# Patient Record
Sex: FEMALE | Race: BLACK OR AFRICAN AMERICAN | Employment: FULL TIME | URBAN - METROPOLITAN AREA
[De-identification: names, ages, dates, MRNs, and addresses within clinical notes are randomized per-mention and may not be internally consistent; named-entity substitution may affect disease eponyms.]

---

## 2017-03-14 ENCOUNTER — OFFICE VISIT (OUTPATIENT)
Dept: OBGYN CLINIC | Age: 32
End: 2017-03-14

## 2017-03-14 VITALS
HEIGHT: 64 IN | DIASTOLIC BLOOD PRESSURE: 87 MMHG | BODY MASS INDEX: 31.76 KG/M2 | SYSTOLIC BLOOD PRESSURE: 134 MMHG | HEART RATE: 89 BPM | WEIGHT: 186 LBS

## 2017-03-14 DIAGNOSIS — N92.6 MISSED MENSES: Primary | ICD-10-CM

## 2017-03-14 NOTE — PROGRESS NOTES
26 y/o  presents as a missed menses. She states she had un-protected sex once and became pregnant un-intentionally. She has noted nausea, but denies vomiting. She denies any bleeding or pain. ROS: per above. Visit Vitals    /87    Pulse 89    Ht 5' 4\" (1.626 m)    Wt 186 lb (84.4 kg)    LMP 2017 (Exact Date)    BMI 31.93 kg/m2     Exam: deferred  TV-U/s: single IUP @ 7w1d. Normal ovaries    A/P:  Early IUP with confirmed FCA. Pt. Declined anti-emetics  RTO in 2 weeks for new OB visit.

## 2017-03-28 ENCOUNTER — ROUTINE PRENATAL (OUTPATIENT)
Dept: OBGYN CLINIC | Age: 32
End: 2017-03-28

## 2017-03-28 VITALS
BODY MASS INDEX: 32.1 KG/M2 | WEIGHT: 188 LBS | HEART RATE: 81 BPM | DIASTOLIC BLOOD PRESSURE: 64 MMHG | SYSTOLIC BLOOD PRESSURE: 121 MMHG | HEIGHT: 64 IN

## 2017-03-28 DIAGNOSIS — Z34.81 ENCOUNTER FOR SUPERVISION OF OTHER NORMAL PREGNANCY IN FIRST TRIMESTER: ICD-10-CM

## 2017-03-28 DIAGNOSIS — Z01.419 WELL WOMAN EXAM WITH ROUTINE GYNECOLOGICAL EXAM: ICD-10-CM

## 2017-03-28 DIAGNOSIS — O21.9 NAUSEA AND VOMITING IN PREGNANCY PRIOR TO 22 WEEKS GESTATION: ICD-10-CM

## 2017-03-28 DIAGNOSIS — Z34.90 PREGNANCY, UNSPECIFIED GESTATIONAL AGE: Primary | ICD-10-CM

## 2017-03-28 LAB
ANTIBODY SCREEN, EXTERNAL: NORMAL
HBSAG, EXTERNAL: NEGATIVE
RUBELLA, EXTERNAL: NORMAL
T. PALLIDUM, EXTERNAL: NON REACTIVE

## 2017-03-28 RX ORDER — ONDANSETRON 4 MG/1
4 TABLET, ORALLY DISINTEGRATING ORAL
Qty: 30 TAB | Refills: 1 | Status: SHIPPED | OUTPATIENT
Start: 2017-03-28 | End: 2017-10-21

## 2017-03-28 NOTE — MR AVS SNAPSHOT
Visit Information Date & Time Provider Department Dept. Phone Encounter #  
 3/28/2017 10:00 AM Hao ReederRocco OB/ [id number] Follow-up Instructions Return in about 4 weeks (around 4/25/2017). Your Appointments 4/25/2017  9:45 AM  
OB VISIT with Hao Reeder MD  
[address] [place] [address]) Appt Note: ob  
 [phone]  
[phone]  
  
   
 [phone] Upcoming Health Maintenance Date Due INFLUENZA AGE 9 TO ADULT 8/1/2016 PAP AKA CERVICAL CYTOLOGY 10/22/2018 Allergies as of 3/28/2017  Review Complete On: 3/28/2017 By: Hao Reeder MD  
 No Known Allergies Current Immunizations  Reviewed on 5/24/2016 Name Date Tdap 3/23/2016 Not reviewed this visit You Were Diagnosed With   
  
 Codes Comments Pregnancy, unspecified gestational age    -  Primary ICD-10-CM: Z33.3 ICD-9-CM: V22.2 Well woman exam with routine gynecological exam     ICD-10-CM: Q47.546 ICD-9-CM: V72.31 Nausea and vomiting in pregnancy prior to 22 weeks gestation     ICD-10-CM: O21.9 ICD-9-CM: 643.00 Encounter for supervision of other normal pregnancy in first trimester     ICD-10-CM: Z34.81 Vitals BP Pulse Height(growth percentile) Weight(growth percentile) LMP BMI  
 121/64 81 5' 4\" (1.626 m) 188 lb (85.3 kg) 01/14/2017 (Exact Date) 32.27 kg/m2 OB Status Smoking Status Pregnant Never Smoker BMI and BSA Data Body Mass Index Body Surface Area  
 32.27 kg/m 2 1.96 m 2 Preferred Pharmacy Pharmacy Name Phone [facility] [address] [facility] [address] [phone] Your Updated Medication List  
  
   
This list is accurate as of: 3/28/17  4:37 PM.  Always use your most recent med list.  
  
  
  
  
 ondansetron 4 mg disintegrating tablet Commonly known as:  ZOFRAN ODT Take 1 Tab by mouth every eight (8) hours as needed for Nausea. Indications: EXCESSIVE VOMITING IN PREGNANCY PRENATAL MULTI PO Take  by mouth. Prescriptions Sent to Pharmacy Refills  
 ondansetron (ZOFRAN ODT) 4 mg disintegrating tablet 1 Sig: Take 1 Tab by mouth every eight (8) hours as needed for Nausea. Indications: EXCESSIVE VOMITING IN PREGNANCY Class: Normal  
 Pharmacy: [address] [place] [address] #: [phone] Route: Oral  
  
We Performed the Following CBC WITH AUTOMATED DIFF [61396 CPT(R)] CULTURE, URINE [id number] CPT(R)] HEMOGLOBIN FRACTIONATION [[id number] Custom] HEP B SURFACE AG [id number] CPT(R)] HIV 1/2 AG/AB, 4TH GENERATION,W RFLX CONFIRM [[id number] Custom] RPR [26506 CPT(R)] RUBELLA AB, IGG [id number] CPT(R)] TYPE & SCREEN [[id number] Custom] Comments:  
 ENTER SURGERY DATE IF FOR PRE-OP TESTING. Follow-up Instructions Return in about 4 weeks (around 4/25/2017). Patient Instructions Managing Morning Sickness: Care Instructions Your Care Instructions For many women, the toughest part of early pregnancy is morning sickness. Morning sickness can range from mild nausea to severe nausea with bouts of vomiting. Symptoms may be worse in the morning, although they can strike at any time of the day or night. If you have nausea, vomiting, or both, look for safe measures that can bring you relief. You can take simple steps at home to manage morning sickness. These steps include changing what and when you eat and avoiding certain foods and smells. Some women find that acupuncture and acupressure wristbands also help. Follow-up care is a key part of your treatment and safety. Be sure to make and go to all appointments, and call your doctor if you are having problems.  It's also a good idea to know your test results and keep a list of the medicines you take. How can you care for yourself at home? · Keep food in your stomach, but not too much at once. Your nausea may be worse if your stomach is empty. Eat five or six small meals a day instead of three large meals. · For morning nausea, eat a small snack, such as a couple of crackers or dry biscuits, before rising. Allow a few minutes for your stomach to settle before you get out of bed slowly. · Drink plenty of fluids, enough so that your urine is light yellow or clear like water. If you have kidney, heart, or liver disease and have to limit fluids, talk with your doctor before you increase the amount of fluids you drink. Some women find that peppermint tea helps with nausea. · Eat more protein, such as chicken, fish, lean meat, beans, nuts, and seeds. · Eat carbohydrate foods, such as potatoes, whole-grain cereals, rice, and pasta. · Avoid smells and foods that make you feel nauseated. Spicy or high-fat foods, citrus juice, milk, coffee, and tea with caffeine often make nausea worse. · Do not drink alcohol. · Do not smoke. Try not to be around others who smoke. If you need help quitting, talk to your doctor about stop-smoking programs and medicines. These can increase your chances of quitting for good. · If you are taking iron supplements, ask your doctor if they are necessary. Iron can make nausea worse. · Get lots of rest. Stress and fatigue can make your morning sickness worse. · Ask your doctor about taking prescription medicine, or over-the-counter products such as vitamin B6, doxylamine, or sofia, to relieve your symptoms. Your doctor can tell you the doses that are safe for you. · Take your prenatal vitamins at night on a full stomach. When should you call for help? Call your doctor now or seek immediate medical care if: 
· You are too sick to your stomach to drink any fluids. · You have symptoms of dehydration, such as: ¨ Dry eyes and a dry mouth. ¨ Passing only a little dark urine. ¨ Feeling thirstier than usual. 
· You have new symptoms such as diarrhea, fever, or belly pain. Watch closely for changes in your health, and be sure to contact your doctor if: 
· You lose weight. · You have ongoing nausea and vomiting. Where can you learn more? Go to http://federica-theo.info/. Enter G946 in the search box to learn more about \"Managing Morning Sickness: Care Instructions. \" Current as of: June 8, 2016 Content Version: 11.2 © 9274-2821 SocialSmack. Care instructions adapted under license by [employer] (which disclaims liability or warranty for this information). If you have questions about a medical condition or this instruction, always ask your healthcare professional. [address] any warranty or liability for your use of this information. Introducing [facility] & HEALTH SERVICES! Romayne Duster introduces [employer] patient portal. Now you can access parts of your medical record, email your doctor's office, and request medication refills online. 1. In your internet browser, go to [email or web address] 2. Click on the First Time User? Click Here link in the Sign In box. You will see the New Member Sign Up page. 3. Enter your [employer] Access Code exactly as it appears below. You will not need to use this code after youve completed the sign-up process. If you do not sign up before the expiration date, you must request a new code. · [employer] Access Code: [email or web address] Expires: 6/12/2017 10:59 AM 
 
4. Enter the last four digits of your Social Security Number (xxxx) and Date of Birth (mm/dd/yyyy) as indicated and click Submit. You will be taken to the next sign-up page. 5. Create a [employer] Systems ID. This will be your [employer] login ID and cannot be changed, so think of one that is secure and easy to remember. 6. Create a Cyprotex password. You can change your password at any time. 7. Enter your Password Reset Question and Answer. This can be used at a later time if you forget your password. 8. Enter your e-mail address. You will receive e-mail notification when new information is available in [address] 9. Click Sign Up. You can now view and download portions of your medical record. 10. Click the Download Summary menu link to download a portable copy of your medical information. If you have questions, please visit the Frequently Asked Questions section of the Cyprotex website. Remember, [employer] is NOT to be used for urgent needs. For medical emergencies, dial 911. Now available from your iPhone and Android! Please provide this summary of care documentation to your next provider. Your primary care clinician is listed as April Thapa. If you have any questions after today's visit, please call [phone].

## 2017-03-28 NOTE — PROGRESS NOTES
PRENATAL INTAKE SUMMARY    Ms. Coleman presents today for her first prenatal visit. She had a confirmatory ultrasound 2 weeks ago. She notes continued nausea, but denies any vomiting. She feels at night it is worse. She denies any bleeding or pain. OB History      Para Term  AB TAB SAB Ectopic Multiple Living    3 2 2      0 1        I have reviewed the patient's medical, obstetrical, social, and family histories, medications, and available lab results. Subjective:   She has no unusual complaints and complains of nausea    Objective:   Initial Physical Exam (New OB)    GENERAL APPEARANCE: alert, well appearing, in no apparent distress  HEAD: normocephalic, atraumatic  MOUTH: mucous membranes moist, pharynx normal without lesions  THYROID: no thyromegaly or masses present  BREASTS: not examined  LUNGS: clear to auscultation, no wheezes, rales or rhonchi, symmetric air entry  HEART: regular rate and rhythm, no murmurs  ABDOMEN: soft, nontender, nondistended, no abnormal masses, no epigastric pain and FHT present  EXTREMITIES: no redness or tenderness in the calves or thighs, no edema  SKIN: normal coloration and turgor, no rashes  LYMPH NODES: no adenopathy palpable  NEUROLOGIC: alert, oriented, normal speech, no focal findings or movement disorder noted    PELVIC EXAM: EXTERNAL GENITALIA: normal appearing vulva with no masses, tenderness or lesions  VAGINA: no abnormal discharge or lesions  CERVIX: no lesions or cervical motion tenderness  UTERUS: gravid and consistent with 10 weeks  ADNEXA: no masses palpable and nontender  OB EXAM PELVIMETRY: appears adequate    Assessment/Plan:   Normal pregnancy with nausea. Orientation to the practice complete. Packet given. Declined genetic screening. RTO 4 weeks. Routine Prenatal care    ICD-10-CM ICD-9-CM    1.  Pregnancy, unspecified gestational age Z33.3 V22.2 CULTURE, URINE      HEMOGLOBIN FRACTIONATION      RPR      RUBELLA AB, IGG      CBC WITH AUTOMATED DIFF      HEP B SURFACE AG      HIV 1/2 AG/AB, 4TH GENERATION,W RFLX CONFIRM      TYPE & SCREEN   2. Well woman exam with routine gynecological exam Z01.419 V72.31 PAP IG, CT-NG-TV, RFX APTIMA HPV ASCUS ([id number])   3.  Nausea and vomiting in pregnancy prior to 22 weeks gestation O21.9 643.00 ondansetron (ZOFRAN ODT) 4 mg disintegrating tablet

## 2017-03-28 NOTE — PATIENT INSTRUCTIONS
Managing Morning Sickness: Care Instructions  Your Care Instructions  For many women, the toughest part of early pregnancy is morning sickness. Morning sickness can range from mild nausea to severe nausea with bouts of vomiting. Symptoms may be worse in the morning, although they can strike at any time of the day or night. If you have nausea, vomiting, or both, look for safe measures that can bring you relief. You can take simple steps at home to manage morning sickness. These steps include changing what and when you eat and avoiding certain foods and smells. Some women find that acupuncture and acupressure wristbands also help. Follow-up care is a key part of your treatment and safety. Be sure to make and go to all appointments, and call your doctor if you are having problems. It's also a good idea to know your test results and keep a list of the medicines you take. How can you care for yourself at home? · Keep food in your stomach, but not too much at once. Your nausea may be worse if your stomach is empty. Eat five or six small meals a day instead of three large meals. · For morning nausea, eat a small snack, such as a couple of crackers or dry biscuits, before rising. Allow a few minutes for your stomach to settle before you get out of bed slowly. · Drink plenty of fluids, enough so that your urine is light yellow or clear like water. If you have kidney, heart, or liver disease and have to limit fluids, talk with your doctor before you increase the amount of fluids you drink. Some women find that peppermint tea helps with nausea. · Eat more protein, such as chicken, fish, lean meat, beans, nuts, and seeds. · Eat carbohydrate foods, such as potatoes, whole-grain cereals, rice, and pasta. · Avoid smells and foods that make you feel nauseated. Spicy or high-fat foods, citrus juice, milk, coffee, and tea with caffeine often make nausea worse. · Do not drink alcohol. · Do not smoke.  Try not to be around others who smoke. If you need help quitting, talk to your doctor about stop-smoking programs and medicines. These can increase your chances of quitting for good. · If you are taking iron supplements, ask your doctor if they are necessary. Iron can make nausea worse. · Get lots of rest. Stress and fatigue can make your morning sickness worse. · Ask your doctor about taking prescription medicine, or over-the-counter products such as vitamin B6, doxylamine, or sofia, to relieve your symptoms. Your doctor can tell you the doses that are safe for you. · Take your prenatal vitamins at night on a full stomach. When should you call for help? Call your doctor now or seek immediate medical care if:  · You are too sick to your stomach to drink any fluids. · You have symptoms of dehydration, such as:  ¨ Dry eyes and a dry mouth. ¨ Passing only a little dark urine. ¨ Feeling thirstier than usual.  · You have new symptoms such as diarrhea, fever, or belly pain. Watch closely for changes in your health, and be sure to contact your doctor if:  · You lose weight. · You have ongoing nausea and vomiting. Where can you learn more? Go to http://federica-theo.info/. Enter E149 in the search box to learn more about \"Managing Morning Sickness: Care Instructions. \"  Current as of: June 8, 2016  Content Version: 11.2  © 8381-9677 Healthwise, Incorporated. Care instructions adapted under license by YelloYello (which disclaims liability or warranty for this information). If you have questions about a medical condition or this instruction, always ask your healthcare professional. [place] [address] any warranty or liability for your use of this information.

## 2017-03-29 LAB
ABSOLUTE LYMPHOCYTE COUNT, 10803: 1.9 K/UL (ref 1–4.8)
BASOPHILS # BLD: 0 K/UL (ref 0–0.2)
BASOPHILS NFR BLD: 1 % (ref 0–2)
EOSINOPHIL # BLD: 0.1 K/UL (ref 0–0.5)
EOSINOPHIL NFR BLD: 1 % (ref 0–6)
ERYTHROCYTE [DISTWIDTH] IN BLOOD BY AUTOMATED COUNT: 12.7 % (ref 10–16)
GRANULOCYTES,GRANS: 57 % (ref 40–75)
HCT VFR BLD AUTO: 39.4 % (ref 35.1–46.5)
HEP B SURFACE AG SCRN, 006510: NORMAL
HGB BLD-MCNC: 13.3 G/DL (ref 11.7–15.5)
LYMPHOCYTES, LYMLT: 34 % (ref 27–45)
MCH RBC QN AUTO: 31 PG (ref 26–34)
MCHC RBC AUTO-ENTMCNC: 34 G/DL (ref 32–36)
MCV RBC AUTO: 93 FL (ref 80–95)
MONOCYTES # BLD: 0.5 K/UL (ref 0.1–0.9)
MONOCYTES NFR BLD: 8 % (ref 3–9)
NEUTROPHILS # BLD AUTO: 3.3 K/UL (ref 1.8–7.7)
PLATELET # BLD AUTO: 278 K/UL (ref 140–440)
PMV BLD AUTO: 12 FL (ref 6–10.8)
RBC # BLD AUTO: 4.25 M/UL (ref 3.8–5.2)
RUBV IGG SERPL IA-ACNC: 1.6 AI
SYPHILIS (T. PALLIDUM) IGG, 15809: NON REACTIVE
WBC # BLD AUTO: 5.8 K/UL (ref 4–11)

## 2017-03-30 LAB
CHLAMYDIA TRACHOMATIS THINPREP, 13342: NEGATIVE
CULTURE RESULT, SENTARA: NORMAL
HIV -1/0/2 AG/AB WITH REFLEX, 13463: NON REACTIVE
HIV 1 & 2 AB SER-IMP: NORMAL
NEISSERIA GONORRHOEAE THINPREP, 13343: NEGATIVE
PAP IMAGE GUIDED, 8900296: NORMAL
TRICHOMONAS NUC AMP-THIN PREP,13357: NEGATIVE

## 2017-04-04 LAB
ABO + RH BLD: NORMAL
ANTIBODY SCREEN INTERPRETATION, 14017: NEGATIVE
HEMOGLOBIN A1,HGBE1: 96.3 % (ref 96–98)
HEMOGLOBIN A2,HGBE2: 3.7 % (ref 2–4)
HEMOGLOBIN C,HGBE5: 0 % (ref 0–0)
HEMOGLOBIN D, 7336: 0 % (ref 0–0)
HEMOGLOBIN ELECTROPHORESIS INTERPRETATION, 406: NORMAL
HEMOGLOBIN F,HGBE3: 0 % (ref 0–0.1)
HEMOGLOBIN S SCREEN, 7338: NORMAL
HEMOGLOBIN S,HGBE4: 0 % (ref 0–0)
HEMOGLOBIN UNKNOWN, 7337: 0 % (ref 0–0)

## 2017-04-25 ENCOUNTER — ROUTINE PRENATAL (OUTPATIENT)
Dept: OBGYN CLINIC | Age: 32
End: 2017-04-25

## 2017-04-25 VITALS
HEIGHT: 64 IN | BODY MASS INDEX: 32.78 KG/M2 | SYSTOLIC BLOOD PRESSURE: 127 MMHG | DIASTOLIC BLOOD PRESSURE: 78 MMHG | WEIGHT: 192 LBS | HEART RATE: 92 BPM

## 2017-04-25 DIAGNOSIS — Z34.90 PREGNANCY, UNSPECIFIED GESTATIONAL AGE: Primary | ICD-10-CM

## 2017-04-25 NOTE — MR AVS SNAPSHOT
Visit Information Date & Time Provider Department Dept. Phone Encounter #  
 4/25/2017  9:45 AM Aramis Youngblood MD [facility] [phone] [id number] Follow-up Instructions Return in about 4 weeks (around 5/23/2017). Upcoming Health Maintenance Date Due INFLUENZA AGE 9 TO ADULT 8/1/2016 PAP AKA CERVICAL CYTOLOGY 3/28/2020 Allergies as of 4/25/2017  Review Complete On: 3/28/2017 By: Aramis Youngblood MD  
 No Known Allergies Current Immunizations  Reviewed on 5/24/2016 Name Date Tdap 3/23/2016 Not reviewed this visit Vitals BP Pulse Height(growth percentile) Weight(growth percentile) LMP BMI  
 127/78 92 5' 4\" (1.626 m) 192 lb (87.1 kg) 01/14/2017 (Exact Date) 32.96 kg/m2 OB Status Smoking Status Pregnant Never Smoker BMI and BSA Data Body Mass Index Body Surface Area  
 32.96 kg/m 2 1.98 m 2 Preferred Pharmacy Pharmacy Name Phone [facility] [address] [place] [address] [phone] Your Updated Medication List  
  
   
This list is accurate as of: 4/25/17 10:31 AM.  Always use your most recent med list.  
  
  
  
  
 ondansetron 4 mg disintegrating tablet Commonly known as:  ZOFRAN ODT Take 1 Tab by mouth every eight (8) hours as needed for Nausea. Indications: EXCESSIVE VOMITING IN PREGNANCY PRENATAL MULTI PO Take  by mouth. Follow-up Instructions Return in about 4 weeks (around 5/23/2017). Patient Instructions Pregnancy and Heartburn: Care Instructions Your Care Instructions Heartburn is a common problem during pregnancy. It's hard to ignore the burning pain or discomfort in your throat or chest. 
Heartburn happens when stomach acid backs up into the tube that carries food to the stomach. This tube is called the esophagus.  Early in pregnancy, heartburn is caused by hormone changes that slow down digestion. Later on, it's also caused by the large uterus pushing up on the stomach. Even though you can't fix the cause, there are things you can do to get relief. And heartburn usually improves or goes away after childbirth. Follow-up care is a key part of your treatment and safety. Be sure to make and go to all appointments, and call your doctor if you are having problems. It's also a good idea to know your test results and keep a list of the medicines you take. How can you care for yourself at home? · Eat small, frequent meals. · Do not eat chocolate, peppermint, or very spicy foods. Avoid drinks with caffeine, such as coffee, tea, and sodas. · Avoid bending over or lying down after meals. · Take a short walk after you eat. · If heartburn is a problem at night, do not eat for 2 hours before bedtime. · Take antacids like Mylanta, Maalox, Rolaids, or Tums. Do not take antacids that have sodium bicarbonate. Be careful when you take over-the-counter antacid medicines. Many of these medicines have aspirin in them. While you are pregnant, do not take aspirin or medicines that contain aspirin unless your doctor says it is okay. · If you're not getting relief, talk to your doctor. You may be able to take a stronger acid-reducing medicine. When should you call for help? Call your doctor now or seek immediate medical care if: 
· You have new belly pain, or your pain gets worse. · Your stools are black. · You have blood in your stools. Watch closely for changes in your health, and be sure to contact your doctor if: 
· You are not getting better after 2 days (48 hours). · Food seems to catch in your throat or chest. 
Where can you learn more? Go to http://federica-theo.info/. Enter M610 in the search box to learn more about \"Pregnancy and Heartburn: Care Instructions. \" Current as of: November 16, 2016 Content Version: 11.2 © 2547-4464 HealthCarnival, Incorporated. Care instructions adapted under license by [employer] (which disclaims liability or warranty for this information). If you have questions about a medical condition or this instruction, always ask your healthcare professional. [address] any warranty or liability for your use of this information. Introducing \[facility]\"" & HEALTH SERVICES! Shi Lara introduces [employer] patient portal. Now you can access parts of your medical record, email your doctor's office, and request medication refills online. 1. In your internet browser, go to [email or web address] 2. Click on the First Time User? Click Here link in the Sign In box. You will see the New Member Sign Up page. 3. Enter your [employer] Access Code exactly as it appears below. You will not need to use this code after youve completed the sign-up process. If you do not sign up before the expiration date, you must request a new code. · [employer] Access Code: [email or web address] Expires: 6/12/2017 10:59 AM 
 
4. Enter the last four digits of your Social Security Number (xxxx) and Date of Birth (mm/dd/yyyy) as indicated and click Submit. You will be taken to the next sign-up page. 5. Create a [employer] Relations ID. This will be your [employer] login ID and cannot be changed, so think of one that is secure and easy to remember. 6. Create a [employer] password. You can change your password at any time. 7. Enter your Password Reset Question and Answer. This can be used at a later time if you forget your password. 8. Enter your e-mail address. You will receive e-mail notification when new information is available in [address] 9. Click Sign Up. You can now view and download portions of your medical record. 10. Click the Download Summary menu link to download a portable copy of your medical information.  
 
If you have questions, please visit the Frequently Asked Questions section of the [employer] Network. Remember, Re[employer] Clothing[employer] is NOT to be used for urgent needs. For medical emergencies, dial 911. Now available from your iPhone and Android! Please provide this summary of care documentation to your next provider. Your primary care clinician is listed as Jayshree Huang. If you have any questions after today's visit, please call [phone].

## 2017-04-25 NOTE — PROGRESS NOTES
13w1d.  Pt. Notes nausea and vomiting much improved. Continues to take Zofran. Declined genetic testing. Labs reviewed. RTO 4 weeks.

## 2017-04-25 NOTE — PATIENT INSTRUCTIONS
Pregnancy and Heartburn: Care Instructions  Your Care Instructions    Heartburn is a common problem during pregnancy. It's hard to ignore the burning pain or discomfort in your throat or chest.  Heartburn happens when stomach acid backs up into the tube that carries food to the stomach. This tube is called the esophagus. Early in pregnancy, heartburn is caused by hormone changes that slow down digestion. Later on, it's also caused by the large uterus pushing up on the stomach. Even though you can't fix the cause, there are things you can do to get relief. And heartburn usually improves or goes away after childbirth. Follow-up care is a key part of your treatment and safety. Be sure to make and go to all appointments, and call your doctor if you are having problems. It's also a good idea to know your test results and keep a list of the medicines you take. How can you care for yourself at home? · Eat small, frequent meals. · Do not eat chocolate, peppermint, or very spicy foods. Avoid drinks with caffeine, such as coffee, tea, and sodas. · Avoid bending over or lying down after meals. · Take a short walk after you eat. · If heartburn is a problem at night, do not eat for 2 hours before bedtime. · Take antacids like Mylanta, Maalox, Rolaids, or Tums. Do not take antacids that have sodium bicarbonate. Be careful when you take over-the-counter antacid medicines. Many of these medicines have aspirin in them. While you are pregnant, do not take aspirin or medicines that contain aspirin unless your doctor says it is okay. · If you're not getting relief, talk to your doctor. You may be able to take a stronger acid-reducing medicine. When should you call for help? Call your doctor now or seek immediate medical care if:  · You have new belly pain, or your pain gets worse. · Your stools are black. · You have blood in your stools.   Watch closely for changes in your health, and be sure to contact your doctor if:  · You are not getting better after 2 days (48 hours). · Food seems to catch in your throat or chest.  Where can you learn more? Go to http://federica-theo.info/. Enter T683 in the search box to learn more about \"Pregnancy and Heartburn: Care Instructions. \"  Current as of: November 16, 2016  Content Version: 11.2  © 4107-8350 bCommunities. Care instructions adapted under license by [employer] (which disclaims liability or warranty for this information). If you have questions about a medical condition or this instruction, always ask your healthcare professional. [address] any warranty or liability for your use of this information.

## 2017-05-24 ENCOUNTER — ROUTINE PRENATAL (OUTPATIENT)
Dept: OBGYN CLINIC | Age: 32
End: 2017-05-24

## 2017-05-24 VITALS
HEIGHT: 64 IN | HEART RATE: 82 BPM | DIASTOLIC BLOOD PRESSURE: 76 MMHG | SYSTOLIC BLOOD PRESSURE: 144 MMHG | BODY MASS INDEX: 33.97 KG/M2 | WEIGHT: 199 LBS

## 2017-05-24 DIAGNOSIS — O09.892 SHORT INTERVAL BETWEEN PREGNANCIES AFFECTING PREGNANCY IN SECOND TRIMESTER, ANTEPARTUM: Primary | ICD-10-CM

## 2017-05-24 NOTE — MR AVS SNAPSHOT
Visit Information Date & Time Provider Department Dept. Phone Encounter #  
 5/24/2017 12:00 PM [phone] [id number] Your Appointments 6/14/2017  8:30 AM  
ULTRASOUND FOLLOW UP with Obgyn Ultrasound [facility] ([facility]) Appt Note: [facility] [phone]  
[phone]  
  
   
 [facility] [phone] Upcoming Health Maintenance Date Due INFLUENZA AGE 9 TO ADULT 8/1/2017 PAP AKA CERVICAL CYTOLOGY 3/28/2020 Allergies as of 5/24/2017  Review Complete On: 3/28/2017 By: Martin Green MD  
 No Known Allergies Current Immunizations  Reviewed on 5/24/2016 Name Date Tdap 3/23/2016 Not reviewed this visit You Were Diagnosed With   
  
 Codes Comments Short interval between pregnancies affecting pregnancy in second trimester, antepartum    -  Primary ICD-10-CM: I61.982 ICD-9-CM: V23.89 Vitals BP Pulse Height(growth percentile) Weight(growth percentile) LMP BMI  
 144/76 82 5' 4\" (1.626 m) 199 lb (90.3 kg) 01/14/2017 (Exact Date) 34.16 kg/m2 OB Status Smoking Status Pregnant Never Smoker BMI and BSA Data Body Mass Index Body Surface Area  
 34.16 kg/m 2 2.02 m 2 Preferred Pharmacy Pharmacy Name Phone [facility] [address], [place] [address] [phone] Your Updated Medication List  
  
   
This list is accurate as of: 5/24/17 12:27 PM.  Always use your most recent med list.  
  
  
  
  
 ondansetron 4 mg disintegrating tablet Commonly known as:  ZOFRAN ODT Take 1 Tab by mouth every eight (8) hours as needed for Nausea. Indications: EXCESSIVE VOMITING IN PREGNANCY PRENATAL MULTI PO Take  by mouth. To-Do List   
 06/14/2017 Imaging: AMB POC US OB >= 14 WKS, 1ST GESTATION Introducing [facility]! Isidro Masterson introduces [employer] patient portal. Now you can access parts of your medical record, email your doctor's office, and request medication refills online. 1. In your internet browser, go to [email or web address] 2. Click on the First Time User? Click Here link in the Sign In box. You will see the New Member Sign Up page. 3. Enter your [employer] Access Code exactly as it appears below. You will not need to use this code after youve completed the sign-up process. If you do not sign up before the expiration date, you must request a new code. · [employer] Access Code: [email or web address] Expires: 6/12/2017 10:59 AM 
 
4. Enter the last four digits of your Social Security Number (xxxx) and Date of Birth (mm/dd/yyyy) as indicated and click Submit. You will be taken to the next sign-up page. 5. Create a [employer] ID. This will be your [employer] login ID and cannot be changed, so think of one that is secure and easy to remember. 6. Create a [employer] password. You can change your password at any time. 7. Enter your Password Reset Question and Answer. This can be used at a later time if you forget your password. 8. Enter your e-mail address. You will receive e-mail notification when new information is available in [address] 9. Click Sign Up. You can now view and download portions of your medical record. 10. Click the Download Summary menu link to download a portable copy of your medical information. If you have questions, please visit the Frequently Asked Questions section of the [employer] website. Remember, [employer] is NOT to be used for urgent needs. For medical emergencies, dial 911. Now available from your iPhone and Android! Please provide this summary of care documentation to your next provider. Your primary care clinician is listed as Eli Romeo. If you have any questions after today's visit, please call [phone].

## 2017-05-24 NOTE — PROGRESS NOTES
17w2d.  NO OB issues. Denies LOf/Vb. Is feeling stressed and anxious. Has  from her  of 1 year. Is currently seeing a therapist and engaged in marital counseling. States she is unable to sleep, tired, and feels her mind racing. She notes she has never had anxiety before, but feels this may be anxiety. Denies depression- discussed common symptoms and risk for postpartum depression. Pt will continue to monitor. Discussed lifestyle modifications and stress relievers. Morph in 3 weeks with RTO in 4 weeks.

## 2017-06-14 ENCOUNTER — CLINICAL SUPPORT (OUTPATIENT)
Dept: OBGYN CLINIC | Age: 32
End: 2017-06-14

## 2017-06-14 DIAGNOSIS — O09.892 SHORT INTERVAL BETWEEN PREGNANCIES AFFECTING PREGNANCY IN SECOND TRIMESTER, ANTEPARTUM: ICD-10-CM

## 2017-06-20 ENCOUNTER — ROUTINE PRENATAL (OUTPATIENT)
Dept: OBGYN CLINIC | Age: 32
End: 2017-06-20

## 2017-06-20 VITALS
HEART RATE: 81 BPM | BODY MASS INDEX: 35 KG/M2 | DIASTOLIC BLOOD PRESSURE: 77 MMHG | HEIGHT: 64 IN | WEIGHT: 205 LBS | SYSTOLIC BLOOD PRESSURE: 133 MMHG

## 2017-06-20 DIAGNOSIS — F32.A DEPRESSION, UNSPECIFIED DEPRESSION TYPE: Primary | ICD-10-CM

## 2017-06-20 PROBLEM — O99.342 DEPRESSION AFFECTING PREGNANCY IN SECOND TRIMESTER, ANTEPARTUM: Status: ACTIVE | Noted: 2017-06-20

## 2017-06-20 RX ORDER — SERTRALINE HYDROCHLORIDE 50 MG/1
50 TABLET, FILM COATED ORAL DAILY
Qty: 30 TAB | Refills: 2 | Status: SHIPPED | OUTPATIENT
Start: 2017-06-20 | End: 2017-10-21

## 2017-06-20 NOTE — PATIENT INSTRUCTIONS

## 2017-06-20 NOTE — PROGRESS NOTES
21w1d.  No OB issues. Pt. Is currently in counseling for marital discord. Has a therapist- Dr. Wendi Malhotra. She has been counseled for a form of depression and recommended medication. Pt. Agrees that she isn't feeling well and is depressed and stressed. Has anhedonia for most things. Discussed risks of anti-depressants. Rx for Zoloft sent to pharmacy. RTO 4 weeks.

## 2017-07-06 ENCOUNTER — ROUTINE PRENATAL (OUTPATIENT)
Dept: OBGYN CLINIC | Age: 32
End: 2017-07-06

## 2017-07-06 VITALS
HEIGHT: 64 IN | SYSTOLIC BLOOD PRESSURE: 122 MMHG | BODY MASS INDEX: 35.51 KG/M2 | HEART RATE: 94 BPM | WEIGHT: 208 LBS | DIASTOLIC BLOOD PRESSURE: 73 MMHG

## 2017-07-06 DIAGNOSIS — O09.892 SHORT INTERVAL BETWEEN PREGNANCIES AFFECTING PREGNANCY IN SECOND TRIMESTER, ANTEPARTUM: Primary | ICD-10-CM

## 2017-07-06 NOTE — PROGRESS NOTES
23w2d.  No OB issues. Denies LOF/Vb  Pt. Notes depression has improved. She didn't start taking the Zoloft and feels better overall. Noted FM. Glucana at next visit. RTO 4 weeks.

## 2017-08-02 ENCOUNTER — ROUTINE PRENATAL (OUTPATIENT)
Dept: OBGYN CLINIC | Age: 32
End: 2017-08-02

## 2017-08-02 VITALS
HEIGHT: 64 IN | BODY MASS INDEX: 35.68 KG/M2 | HEART RATE: 105 BPM | WEIGHT: 209 LBS | SYSTOLIC BLOOD PRESSURE: 141 MMHG | DIASTOLIC BLOOD PRESSURE: 79 MMHG

## 2017-08-02 DIAGNOSIS — Z3A.27 27 WEEKS GESTATION OF PREGNANCY: Primary | ICD-10-CM

## 2017-08-02 NOTE — PATIENT INSTRUCTIONS
Weeks 26 to 30 of Your Pregnancy: Care Instructions  Your Care Instructions    You are now in your last trimester of pregnancy. Your baby is growing rapidly. And you'll probably feel your baby moving around more often. Your doctor may ask you to count your baby's kicks. Your back may ache as your body gets used to your baby's size and length. If you haven't already had the Tdap shot during this pregnancy, talk to your doctor about getting it. It will help protect your  against pertussis infection. During this time, it's important to take care of yourself and pay attention to what your body needs. If you feel sexual, explore ways to be close with your partner that match your comfort and desire. Use the tips provided in this care sheet to find ways to be sexual in your own way. Follow-up care is a key part of your treatment and safety. Be sure to make and go to all appointments, and call your doctor if you are having problems. It's also a good idea to know your test results and keep a list of the medicines you take. How can you care for yourself at home? Take it easy at work  · Take frequent breaks. If possible, stop working when you are tired, and rest during your lunch hour. · Take bathroom breaks every 2 hours. · Change positions often. If you sit for long periods, stand up and walk around. · When you stand for a long time, keep one foot on a low stool with your knee bent. After standing a lot, sit with your feet up. · Avoid fumes, chemicals, and tobacco smoke. Be sexual in your own way  · Having sex during pregnancy is okay, unless your doctor tells you not to. · You may be very interested in sex, or you may have no interest at all. · Your growing belly can make it hard to find a good position during intercourse. [place] and explore. · You may get cramps in your uterus when your partner touches your breasts.   · A back rub may relieve the backache or cramps that sometimes follow orgasm. Learn about  labor  · Watch for signs of  labor. You may be going into labor if:  ¨ You have menstrual-like cramps, with or without nausea. ¨ You have about 4 or more contractions in 20 minutes, or about 8 or more within 1 hour, even after you have had a glass of water and are resting. ¨ You have a low, dull backache that does not go away when you change your position. ¨ You have pain or pressure in your pelvis that comes and goes in a pattern. ¨ You have intestinal cramping or flu-like symptoms, with or without diarrhea. ¨ You notice an increase or change in your vaginal discharge. Discharge may be heavy, mucus-like, watery, or streaked with blood. ¨ Your water breaks. · If you think you have  labor:  ¨ Drink 2 or 3 glasses of water or juice. Not drinking enough fluids can cause contractions. ¨ Stop what you are doing, and empty your bladder. Then lie down on your left side for at least 1 hour. ¨ While lying on your side, find your breast bone. Put your fingers in the soft spot just below it. Move your fingers down toward your belly button to find the top of your uterus. Check to see if it is tight. ¨ Contractions can be weak or strong. Record your contractions for an hour. Time a contraction from the start of one contraction to the start of the next one. ¨ Single or several strong contractions without a pattern are called Jonah-Reinoso contractions. They are practice contractions but not the start of labor. They often stop if you change what you are doing. ¨ Call your doctor if you have regular contractions. Where can you learn more? Go to http://federica-theo.info/. Enter C440 in the search box to learn more about \"Weeks 26 to 30 of Your Pregnancy: Care Instructions. \"  Current as of: 2017  Content Version: 11.3  © 9998-2094 [employer].  Care instructions adapted under license by [employer] (which disclaims liability or warranty for this information). If you have questions about a medical condition or this instruction, always ask your healthcare professional. [place] [address] any warranty or liability for your use of this information.

## 2017-08-02 NOTE — MR AVS SNAPSHOT
Visit Information Date & Time Provider Department Dept. Phone Encounter #  
 8/2/2017  9:30 AM [facility] [place] [phone] [id number] Follow-up Instructions Return in about 3 weeks (around 8/23/2017). Your Appointments 8/23/2017  2:15 PM  
OB VISIT with Alyson Guevara MD  
[address] [place] [address] ([address] [place] [address]) Appt Note: ob  
 [address] [phone]  
[phone]  
  
   
 [address] [phone] Upcoming Health Maintenance Date Due  
 OB 3RD TRIMESTER TDAP 7/31/2017 INFLUENZA AGE 9 TO ADULT 8/1/2017 PAP AKA CERVICAL CYTOLOGY 3/28/2020 Allergies as of 8/2/2017  Review Complete On: 6/20/2017 By: Alyson Guevara MD  
 No Known Allergies Current Immunizations  Reviewed on 5/24/2016 Name Date Tdap 3/23/2016 Not reviewed this visit You Were Diagnosed With   
  
 Codes Comments 27 weeks gestation of pregnancy    -  Primary ICD-10-CM: Z3A.27 
ICD-9-CM: V22.2 Vitals BP Pulse Height(growth percentile) Weight(growth percentile) LMP BMI  
 141/79 (!) 105 5' 4\" (1.626 m) 209 lb (94.8 kg) 01/14/2017 (Exact Date) 35.87 kg/m2 OB Status Smoking Status Pregnant Never Smoker BMI and BSA Data Body Mass Index Body Surface Area  
 35.87 kg/m 2 2.07 m 2 Preferred Pharmacy Pharmacy Name Phone [facility] [address] [place], [place] [address] [address] [phone] Your Updated Medication List  
  
   
This list is accurate as of: 8/2/17 10:48 AM.  Always use your most recent med list.  
  
  
  
  
 ondansetron 4 mg disintegrating tablet Commonly known as:  ZOFRAN ODT Take 1 Tab by mouth every eight (8) hours as needed for Nausea. Indications: EXCESSIVE VOMITING IN PREGNANCY PRENATAL MULTI PO Take  by mouth. sertraline 50 mg tablet Commonly known as:  ZOLOFT  
 Take 1 Tab by mouth daily. Indications: ANXIETY WITH DEPRESSION Follow-up Instructions Return in about 3 weeks (around 2017). Patient Instructions Weeks 26 to 30 of Your Pregnancy: Care Instructions Your Care Instructions You are now in your last trimester of pregnancy. Your baby is growing rapidly. And you'll probably feel your baby moving around more often. Your doctor may ask you to count your baby's kicks. Your back may ache as your body gets used to your baby's size and length. If you haven't already had the Tdap shot during this pregnancy, talk to your doctor about getting it. It will help protect your  against pertussis infection. During this time, it's important to take care of yourself and pay attention to what your body needs. If you feel sexual, explore ways to be close with your partner that match your comfort and desire. Use the tips provided in this care sheet to find ways to be sexual in your own way. Follow-up care is a key part of your treatment and safety. Be sure to make and go to all appointments, and call your doctor if you are having problems. It's also a good idea to know your test results and keep a list of the medicines you take. How can you care for yourself at home? Take it easy at work · Take frequent breaks. If possible, stop working when you are tired, and rest during your lunch hour. · Take bathroom breaks every 2 hours. · Change positions often. If you sit for long periods, stand up and walk around. · When you stand for a long time, keep one foot on a low stool with your knee bent. After standing a lot, sit with your feet up. · Avoid fumes, chemicals, and tobacco smoke. Be sexual in your own way · Having sex during pregnancy is okay, unless your doctor tells you not to. · You may be very interested in sex, or you may have no interest at all.  
· Your growing belly can make it hard to find a good position during intercourse. [place] and explore. · You may get cramps in your uterus when your partner touches your breasts. · A back rub may relieve the backache or cramps that sometimes follow orgasm. Learn about  labor · Watch for signs of  labor. You may be going into labor if: 
¨ You have menstrual-like cramps, with or without nausea. ¨ You have about 4 or more contractions in 20 minutes, or about 8 or more within 1 hour, even after you have had a glass of water and are resting. ¨ You have a low, dull backache that does not go away when you change your position. ¨ You have pain or pressure in your pelvis that comes and goes in a pattern. ¨ You have intestinal cramping or flu-like symptoms, with or without diarrhea. ¨ You notice an increase or change in your vaginal discharge. Discharge may be heavy, mucus-like, watery, or streaked with blood. ¨ Your water breaks. · If you think you have  labor: ¨ Drink 2 or 3 glasses of water or juice. Not drinking enough fluids can cause contractions. ¨ Stop what you are doing, and empty your bladder. Then lie down on your left side for at least 1 hour. ¨ While lying on your side, find your breast bone. Put your fingers in the soft spot just below it. Move your fingers down toward your belly button to find the top of your uterus. Check to see if it is tight. ¨ Contractions can be weak or strong. Record your contractions for an hour. Time a contraction from the start of one contraction to the start of the next one. ¨ Single or several strong contractions without a pattern are called [place]-Reinoso contractions. They are practice contractions but not the start of labor. They often stop if you change what you are doing. ¨ Call your doctor if you have regular contractions. Where can you learn more? Go to http://federica-theo.info/. Enter R786 in the search box to learn more about \"Weeks 26 to 30 of Your Pregnancy: Care Instructions. \" 
 Current as of: March 16, 2017 Content Version: 11.3 © 5193-5594 [employer]. Care instructions adapted under license by [employer] (which disclaims liability or warranty for this information). If you have questions about a medical condition or this instruction, always ask your healthcare professional. [address] any warranty or liability for your use of this information. Introducing [facility]! Derrick Farrell introduces [employer] patient portal. Now you can access parts of your medical record, email your doctor's office, and request medication refills online. 1. In your internet browser, go to [email or web address] 2. Click on the First Time User? Click Here link in the Sign In box. You will see the New Member Sign Up page. 3. Enter your [employer] Access Code exactly as it appears below. You will not need to use this code after youve completed the sign-up process. If you do not sign up before the expiration date, you must request a new code. · [employer] Access Code: [email or web address] Expires: 10/4/2017  3:43 PM 
 
4. Enter the last four digits of your Social Security Number (xxxx) and Date of Birth (mm/dd/yyyy) as indicated and click Submit. You will be taken to the next sign-up page. 5. Create a [employer] ID. This will be your [employer] login ID and cannot be changed, so think of one that is secure and easy to remember. 6. Create a [employer] password. You can change your password at any time. 7. Enter your Password Reset Question and Answer. This can be used at a later time if you forget your password. 8. Enter your e-mail address. You will receive e-mail notification when new information is available in [address] 9. Click Sign Up. You can now view and download portions of your medical record. 10. Click the Download Summary menu link to download a portable copy of your medical information. If you have questions, please visit the Frequently Asked Questions section of the [employer] website. Remember, [employer] is NOT to be used for urgent needs. For medical emergencies, dial 911. Now available from your iPhone and Android! Please provide this summary of care documentation to your next provider. Your primary care clinician is listed as Michelle Serrato. If you have any questions after today's visit, please call [phone].

## 2017-08-09 ENCOUNTER — ROUTINE PRENATAL (OUTPATIENT)
Dept: OBGYN CLINIC | Age: 32
End: 2017-08-09

## 2017-08-09 VITALS
BODY MASS INDEX: 36.19 KG/M2 | DIASTOLIC BLOOD PRESSURE: 80 MMHG | RESPIRATION RATE: 18 BRPM | HEART RATE: 98 BPM | SYSTOLIC BLOOD PRESSURE: 140 MMHG | WEIGHT: 212 LBS | HEIGHT: 64 IN

## 2017-08-09 DIAGNOSIS — N89.8 VAGINAL DISCHARGE DURING PREGNANCY IN THIRD TRIMESTER: Primary | ICD-10-CM

## 2017-08-09 DIAGNOSIS — Z3A.27 27 WEEKS GESTATION OF PREGNANCY: ICD-10-CM

## 2017-08-09 DIAGNOSIS — O26.893 VAGINAL DISCHARGE DURING PREGNANCY IN THIRD TRIMESTER: Primary | ICD-10-CM

## 2017-08-09 RX ORDER — TERCONAZOLE 4 MG/G
1 CREAM VAGINAL
Qty: 45 G | Refills: 0 | Status: SHIPPED | OUTPATIENT
Start: 2017-08-09 | End: 2017-08-16

## 2017-08-10 LAB — WET MOUNT POCT, WMPOCT: NORMAL

## 2017-08-10 NOTE — PROGRESS NOTES
Patient presents for a problem visit complaining of a thick white vaginal discharge and vaginal itching. She has no OB complaints and notes good fetal movement. Wet prep: no clue cells, trace yeast, no trich, minimal WBCs  Rx Terazol. Follow up as scheduled for routine OB care. Plan of care discussed. Patient expressed understanding.

## 2017-08-25 ENCOUNTER — ROUTINE PRENATAL (OUTPATIENT)
Dept: OBGYN CLINIC | Age: 32
End: 2017-08-25

## 2017-08-25 VITALS
DIASTOLIC BLOOD PRESSURE: 75 MMHG | HEIGHT: 64 IN | BODY MASS INDEX: 36.19 KG/M2 | WEIGHT: 212 LBS | SYSTOLIC BLOOD PRESSURE: 128 MMHG | HEART RATE: 87 BPM

## 2017-08-25 DIAGNOSIS — Z3A.30 PREGNANCY WITH 30 COMPLETED WEEKS GESTATION: Primary | ICD-10-CM

## 2017-08-25 NOTE — PATIENT INSTRUCTIONS
Weeks 30 to 32 of Your Pregnancy: Care Instructions  Your Care Instructions    You have made it to the final months of your pregnancy. By now, your baby is really starting to look like a baby, with hair and plump skin. As you enter the final weeks of pregnancy, the reality of having a baby may start to set in. This is the time to settle on a name, get your household in order, set up a safe nursery, and find quality  if needed. Doing these things in advance will allow you to focus on caring for and enjoying your new baby. You may also want to have a tour of your hospital's labor and delivery unit to get a better idea of what to expect while you are in the hospital.  During these last months, it is very important to take good care of yourself and pay attention to what your body needs. If your doctor says it is okay for you to work, don't push yourself too hard. Use the tips provided in this care sheet to ease heartburn and care for varicose veins. If you haven't already had the Tdap shot during this pregnancy, talk to your doctor about getting it. It will help protect your  against pertussis infection. Follow-up care is a key part of your treatment and safety. Be sure to make and go to all appointments, and call your doctor if you are having problems. It's also a good idea to know your test results and keep a list of the medicines you take. How can you care for yourself at home? Pay attention to your baby's movements  · You should feel your baby move several times every day. · Your baby now turns less, and kicks and jabs more. · Your baby sleeps 20 to 45 minutes at a time and is more active at certain times of day. · If your doctor wants you to count your baby's kicks:  ¨ Empty your bladder, and lie on your side or relax in a comfortable chair. ¨ Write down your start time. ¨ Pay attention only to your baby's movements. Count any movement except hiccups.   ¨ After you have counted 10 movements, write down your stop time. ¨ Write down how many minutes it took for your baby to move 10 times. ¨ If an hour goes by and you have not recorded 10 movements, have something to eat or drink and then count for another hour. If you do not record 10 movements in either hour, call your doctor. Ease heartburn  · Eat small, frequent meals. · Do not eat chocolate, peppermint, or very spicy foods. Avoid drinks with caffeine, such as coffee, tea, and sodas. · Avoid bending over or lying down after meals. · Talk a short walk after you eat. · If heartburn is a problem at night, do not eat for 2 hours before bedtime. · Take antacids like Mylanta, Maalox, Rolaids, or Tums. Do not take antacids that have sodium bicarbonate. Care for varicose veins  · Varicose veins are blood vessels that stretch out with the extra blood during pregnancy. Your legs may ache or throb. Most varicose veins will go away after the birth. · Avoid standing for long periods of time. Sit with your legs crossed at the ankles, not the knees. · Sit with your feet propped up. · Avoid tight clothing or stockings. Wear support hose. · Exercise regularly. Try walking for at least 30 minutes a day. Where can you learn more? Go to http://federica-theo.info/. Enter U465 in the search box to learn more about \"Weeks 30 to 32 of Your Pregnancy: Care Instructions. \"  Current as of: March 16, 2017  Content Version: 11.3  © 6842-4154 [employer]. Care instructions adapted under license by [employer] (which disclaims liability or warranty for this information). If you have questions about a medical condition or this instruction, always ask your healthcare professional. [place] [address] any warranty or liability for your use of this information.

## 2017-08-25 NOTE — PROGRESS NOTES
30w4d.  No OB issues. Denies LOF/Vb  Noted FM  Glucola and CBC normal.  Depression stable. Previously diagnosed candida infection- resolved. RTO 2 weeks.

## 2017-08-25 NOTE — MR AVS SNAPSHOT
Visit Information Date & Time Provider Department Dept. Phone Encounter #  
 8/25/2017  2:45 PM Rocco Ferrari [phone] [id number] Follow-up Instructions Return in about 2 weeks (around 9/8/2017). Upcoming Health Maintenance Date Due  
 OB 3RD TRIMESTER TDAP 7/31/2017 INFLUENZA AGE 9 TO ADULT 8/1/2017 PAP AKA CERVICAL CYTOLOGY 3/28/2020 Allergies as of 8/25/2017  Review Complete On: 8/10/2017 By: Doug Rubi,  No Known Allergies Current Immunizations  Reviewed on 5/24/2016 Name Date Tdap 3/23/2016 Not reviewed this visit Vitals BP Pulse Height(growth percentile) Weight(growth percentile) LMP BMI  
 128/75 87 5' 4\" (1.626 m) 212 lb (96.2 kg) 01/14/2017 (Exact Date) 36.39 kg/m2 OB Status Smoking Status Pregnant Never Smoker BMI and BSA Data Body Mass Index Body Surface Area  
 36.39 kg/m 2 2.08 m 2 Preferred Pharmacy Pharmacy Name Phone [facility] [address] [facility]  [phone] Your Updated Medication List  
  
   
This list is accurate as of: 8/25/17  3:47 PM.  Always use your most recent med list.  
  
  
  
  
 ondansetron 4 mg disintegrating tablet Commonly known as:  ZOFRAN ODT Take 1 Tab by mouth every eight (8) hours as needed for Nausea. Indications: EXCESSIVE VOMITING IN PREGNANCY PRENATAL MULTI PO Take  by mouth. sertraline 50 mg tablet Commonly known as:  ZOLOFT Take 1 Tab by mouth daily. Indications: ANXIETY WITH DEPRESSION Follow-up Instructions Return in about 2 weeks (around 9/8/2017). Patient Instructions Weeks 30 to 32 of Your Pregnancy: Care Instructions Your Care Instructions You have made it to the final months of your pregnancy. By now, your baby is really starting to look like a baby, with hair and plump skin. As you enter the final weeks of pregnancy, the reality of having a baby may start to set in. This is the time to settle on a name, get your household in order, set up a safe nursery, and find quality  if needed. Doing these things in advance will allow you to focus on caring for and enjoying your new baby. You may also want to have a tour of your hospital's labor and delivery unit to get a better idea of what to expect while you are in the hospital. 
During these last months, it is very important to take good care of yourself and pay attention to what your body needs. If your doctor says it is okay for you to work, don't push yourself too hard. Use the tips provided in this care sheet to ease heartburn and care for varicose veins. If you haven't already had the Tdap shot during this pregnancy, talk to your doctor about getting it. It will help protect your  against pertussis infection. Follow-up care is a key part of your treatment and safety. Be sure to make and go to all appointments, and call your doctor if you are having problems. It's also a good idea to know your test results and keep a list of the medicines you take. How can you care for yourself at home? Pay attention to your baby's movements · You should feel your baby move several times every day. · Your baby now turns less, and kicks and jabs more. · Your baby sleeps 20 to 45 minutes at a time and is more active at certain times of day. · If your doctor wants you to count your baby's kicks: 
¨ Empty your bladder, and lie on your side or relax in a comfortable chair. ¨ Write down your start time. ¨ Pay attention only to your baby's movements. Count any movement except hiccups. ¨ After you have counted 10 movements, write down your stop time. ¨ Write down how many minutes it took for your baby to move 10 times.  
¨ If an hour goes by and you have not recorded 10 movements, have something to eat or drink and then count for another hour. If you do not record 10 movements in either hour, call your doctor. Ease heartburn · Eat small, frequent meals. · Do not eat chocolate, peppermint, or very spicy foods. Avoid drinks with caffeine, such as coffee, tea, and sodas. · Avoid bending over or lying down after meals. · Talk a short walk after you eat. · If heartburn is a problem at night, do not eat for 2 hours before bedtime. · Take antacids like Mylanta, Maalox, Rolaids, or Tums. Do not take antacids that have sodium bicarbonate. Care for varicose veins · Varicose veins are blood vessels that stretch out with the extra blood during pregnancy. Your legs may ache or throb. Most varicose veins will go away after the birth. · Avoid standing for long periods of time. Sit with your legs crossed at the ankles, not the knees. · Sit with your feet propped up. · Avoid tight clothing or stockings. Wear support hose. · Exercise regularly. Try walking for at least 30 minutes a day. Where can you learn more? Go to http://federica-theo.info/. Enter B064 in the search box to learn more about \"Weeks 30 to 32 of Your Pregnancy: Care Instructions. \" Current as of: March 16, 2017 Content Version: 11.3 © 5516-4587 [employer]. Care instructions adapted under license by [employer] (which disclaims liability or warranty for this information). If you have questions about a medical condition or this instruction, always ask your healthcare professional. [place] [address] any warranty or liability for your use of this information. Introducing [facility] & HE[facility] SERVICES! [employer] introduces [employer] patient portal. Now you can access parts of your medical record, email your doctor's office, and request medication refills online. 1. In your internet browser, go to [email or web address] 2. Click on the First Time User? Click Here link in the Sign In box. You will see the New Member Sign Up page. 3. Enter your GreenNote Access Code exactly as it appears below. You will not need to use this code after youve completed the sign-up process. If you do not sign up before the expiration date, you must request a new code. · GreenNote Access Code: [email or web address] Expires: 10/4/2017  3:43 PM 
 
4. Enter the last four digits of your Social Security Number (xxxx) and Date of Birth (mm/dd/yyyy) as indicated and click Submit. You will be taken to the next sign-up page. 5. Create a GreenNote ID. This will be your GreenNote login ID and cannot be changed, so think of one that is secure and easy to remember. 6. Create a GreenNote password. You can change your password at any time. 7. Enter your Password Reset Question and Answer. This can be used at a later time if you forget your password. 8. Enter your e-mail address. You will receive e-mail notification when new information is available in [address] 9. Click Sign Up. You can now view and download portions of your medical record. 10. Click the Download Summary menu link to download a portable copy of your medical information. If you have questions, please visit the Frequently Asked Questions section of the GreenNote website. Remember, GreenNote is NOT to be used for urgent needs. For medical emergencies, dial 911. Now available from your iPhone and Android! Please provide this summary of care documentation to your next provider. Your primary care clinician is listed as Faby Ann. If you have any questions after today's visit, please call [phone].

## 2017-09-06 ENCOUNTER — ROUTINE PRENATAL (OUTPATIENT)
Dept: OBGYN CLINIC | Age: 32
End: 2017-09-06

## 2017-09-06 VITALS
BODY MASS INDEX: 36.7 KG/M2 | DIASTOLIC BLOOD PRESSURE: 78 MMHG | HEART RATE: 105 BPM | SYSTOLIC BLOOD PRESSURE: 121 MMHG | HEIGHT: 64 IN | WEIGHT: 215 LBS

## 2017-09-06 DIAGNOSIS — Z3A.30 PREGNANCY WITH 30 COMPLETED WEEKS GESTATION: ICD-10-CM

## 2017-09-06 DIAGNOSIS — Z3A.32 PREGNANCY WITH 32 COMPLETED WEEKS GESTATION: Primary | ICD-10-CM

## 2017-09-06 NOTE — PROGRESS NOTES
32w2d. No OB issues. Notes headache for the past week and hand swelling. Headache improved with rest.  Noted elevated blood pressure with repeat normal.  U/A: + protein; noted dehydration- encouraged repletion. PIH precautions. Will work-up if persists.

## 2017-09-06 NOTE — MR AVS SNAPSHOT
Visit Information Date & Time Provider Department Dept. Phone Encounter #  
 9/6/2017  4:00 PM MD Aleksandr Olivera [phone] [id number] Follow-up Instructions Return in about 2 weeks (around 9/20/2017). Upcoming Health Maintenance Date Due  
 OB 3RD TRIMESTER TDAP 7/31/2017 INFLUENZA AGE 9 TO ADULT 8/1/2017 PAP AKA CERVICAL CYTOLOGY 3/28/2020 Allergies as of 9/6/2017  Review Complete On: 8/10/2017 By: Valentín De León DO No Known Allergies Current Immunizations  Reviewed on 5/24/2016 Name Date Tdap 3/23/2016 Not reviewed this visit Vitals BP Pulse Height(growth percentile) Weight(growth percentile) LMP BMI  
 121/78 (!) 105 5' 4\" (1.626 m) 215 lb (97.5 kg) 01/14/2017 (Exact Date) 36.9 kg/m2 OB Status Smoking Status Pregnant Never Smoker Vitals History BMI and BSA Data Body Mass Index Body Surface Area  
 36.9 kg/m 2 2.1 m 2 Preferred Pharmacy Pharmacy Name Phone [facility] [address] [place], [place] [address] [address] [phone] Your Updated Medication List  
  
   
This list is accurate as of: 9/6/17  4:40 PM.  Always use your most recent med list.  
  
  
  
  
 ondansetron 4 mg disintegrating tablet Commonly known as:  ZOFRAN ODT Take 1 Tab by mouth every eight (8) hours as needed for Nausea. Indications: EXCESSIVE VOMITING IN PREGNANCY PRENATAL MULTI PO Take  by mouth. sertraline 50 mg tablet Commonly known as:  ZOLOFT Take 1 Tab by mouth daily. Indications: ANXIETY WITH DEPRESSION Follow-up Instructions Return in about 2 weeks (around 9/20/2017). Patient Instructions Weeks 32 to 34 of Your Pregnancy: Care Instructions Your Care Instructions During the last few weeks of your pregnancy, you may have more aches and pains. It's important to rest when you can. Your growing baby is putting more pressure on your bladder. So you may need to urinate more often. Hemorrhoids are also common. These are painful, itchy veins in the rectal area. In the 36th week, most women have a test for group B streptococcus (GBS). GBS is a common bacteria that can live in the vagina and rectum. It can make your baby sick after birth. If you test positive, you will get antibiotics during labor. These will keep your baby from getting the bacteria. You may want to talk with your doctor about banking your baby's umbilical cord blood. This is the blood left in the cord after birth. If you want to save this blood, you must arrange it ahead of time. You can't decide at the last minute. If you haven't already had the Tdap shot during this pregnancy, talk to your doctor about getting it. It will help protect your  against pertussis infection. Follow-up care is a key part of your treatment and safety. Be sure to make and go to all appointments, and call your doctor if you are having problems. It's also a good idea to know your test results and keep a list of the medicines you take. How can you care for yourself at home? Ease hemorrhoids · Get more liquids, fruits, vegetables, and fiber in your diet. This will help keep your stools soft. · Avoid sitting for too long. Lie on your left side several times a day. · Clean yourself with soft, moist toilet paper. Or you can use witch hazel pads or personal hygiene pads. · If you are uncomfortable, try ice packs. Or you can sit in a warm sitz bath. Do these for 20 minutes at a time, as needed. · Use hydrocortisone cream for pain and itching. Two examples are Anusol and Preparation H Hydrocortisone. · Ask your doctor about taking an over-the-counter stool softener. Consider breastfeeding · Experts recommend that women breastfeed for 1 year or longer. Breast milk is the perfect food for babies. · Breast milk is easier for babies to digest than formula. And it is always available, just the right temperature, and free. · In general, babies who are  are healthier than formula-fed babies. ¨  babies are less likely to get ear infections, colds, diarrhea, and pneumonia. ¨  babies who are fed only breast milk are less likely to get asthma and allergies. ¨  babies are less likely to be obese. ¨  babies are less likely to get diabetes or heart disease. · Women who breastfeed have less bleeding after the birth. Their uteruses also shrink back faster. · Some women who breastfeed lose weight faster. Making milk burns calories. · Breastfeeding can lower your risk of breast cancer, ovarian cancer, and osteoporosis. Decide about circumcision for boys · As you make this decision, it may help to think about your personal, Orthodoxy, and family traditions. You get to decide if you will keep your son's penis natural or if he will be circumcised. · If you decide that you would like to have your baby circumcised, talk with your doctor. You can share your concerns about pain. And you can discuss your preferences for anesthesia. Where can you learn more? Go to http://federica-theo.info/. Enter O119 in the search box to learn more about \"Weeks 32 to 34 of Your Pregnancy: Care Instructions. \" Current as of: March 16, 2017 Content Version: 11.3 © 3095-0847 [employer]. Care instructions adapted under license by [employer] (which disclaims liability or warranty for this information). If you have questions about a medical condition or this instruction, always ask your healthcare professional. [place] [address] any warranty or liability for your use of this information. Introducing [facility] & HEALTH SERVICES!    
 Risa Joyce introduces [employer] patient portal. Now you can access parts of your medical record, email your doctor's office, and request medication refills online. 1. In your internet browser, go to [email or web address] 2. Click on the First Time User? Click Here link in the Sign In box. You will see the New Member Sign Up page. 3. Enter your Fligoo Access Code exactly as it appears below. You will not need to use this code after youve completed the sign-up process. If you do not sign up before the expiration date, you must request a new code. · Fligoo Access Code: [email or web address] Expires: 10/4/2017  3:43 PM 
 
4. Enter the last four digits of your Social Security Number (xxxx) and Date of Birth (mm/dd/yyyy) as indicated and click Submit. You will be taken to the next sign-up page. 5. Create a Fligoo ID. This will be your Fligoo login ID and cannot be changed, so think of one that is secure and easy to remember. 6. Create a Fligoo password. You can change your password at any time. 7. Enter your Password Reset Question and Answer. This can be used at a later time if you forget your password. 8. Enter your e-mail address. You will receive e-mail notification when new information is available in [address] 9. Click Sign Up. You can now view and download portions of your medical record. 10. Click the Download Summary menu link to download a portable copy of your medical information. If you have questions, please visit the Frequently Asked Questions section of the Fligoo website. Remember, Fligoo is NOT to be used for urgent needs. For medical emergencies, dial 911. Now available from your iPhone and Android! Please provide this summary of care documentation to your next provider. Your primary care clinician is listed as Mitch Zepeda. If you have any questions after today's visit, please call [phone].

## 2017-09-06 NOTE — PATIENT INSTRUCTIONS
Weeks 32 to 34 of Your Pregnancy: Care Instructions  Your Care Instructions    During the last few weeks of your pregnancy, you may have more aches and pains. It's important to rest when you can. Your growing baby is putting more pressure on your bladder. So you may need to urinate more often. Hemorrhoids are also common. These are painful, itchy veins in the rectal area. In the 36th week, most women have a test for group B streptococcus (GBS). GBS is a common bacteria that can live in the vagina and rectum. It can make your baby sick after birth. If you test positive, you will get antibiotics during labor. These will keep your baby from getting the bacteria. You may want to talk with your doctor about banking your baby's umbilical cord blood. This is the blood left in the cord after birth. If you want to save this blood, you must arrange it ahead of time. You can't decide at the last minute. If you haven't already had the Tdap shot during this pregnancy, talk to your doctor about getting it. It will help protect your  against pertussis infection. Follow-up care is a key part of your treatment and safety. Be sure to make and go to all appointments, and call your doctor if you are having problems. It's also a good idea to know your test results and keep a list of the medicines you take. How can you care for yourself at home? Ease hemorrhoids  · Get more liquids, fruits, vegetables, and fiber in your diet. This will help keep your stools soft. · Avoid sitting for too long. Lie on your left side several times a day. · Clean yourself with soft, moist toilet paper. Or you can use witch hazel pads or personal hygiene pads. · If you are uncomfortable, try ice packs. Or you can sit in a warm sitz bath. Do these for 20 minutes at a time, as needed. · Use hydrocortisone cream for pain and itching. Two examples are Anusol and Preparation H Hydrocortisone.   · Ask your doctor about taking an over-the-counter stool softener. Consider breastfeeding  · Experts recommend that women breastfeed for 1 year or longer. Breast milk is the perfect food for babies. · Breast milk is easier for babies to digest than formula. And it is always available, just the right temperature, and free. · In general, babies who are  are healthier than formula-fed babies. ¨  babies are less likely to get ear infections, colds, diarrhea, and pneumonia. ¨  babies who are fed only breast milk are less likely to get asthma and allergies. ¨  babies are less likely to be obese. ¨  babies are less likely to get diabetes or heart disease. · Women who breastfeed have less bleeding after the birth. Their uteruses also shrink back faster. · Some women who breastfeed lose weight faster. Making milk burns calories. · Breastfeeding can lower your risk of breast cancer, ovarian cancer, and osteoporosis. Decide about circumcision for boys  · As you make this decision, it may help to think about your personal, Hoahaoism, and family traditions. You get to decide if you will keep your son's penis natural or if he will be circumcised. · If you decide that you would like to have your baby circumcised, talk with your doctor. You can share your concerns about pain. And you can discuss your preferences for anesthesia. Where can you learn more? Go to http://federica-theo.info/. Enter E749 in the search box to learn more about \"Weeks 32 to 34 of Your Pregnancy: Care Instructions. \"  Current as of: March 16, 2017  Content Version: 11.3  © 6018-7632 Healthwise, Incorporated. Care instructions adapted under license by [employer] (which disclaims liability or warranty for this information).  If you have questions about a medical condition or this instruction, always ask your healthcare professional. [place] [address] any warranty or liability for your use of this information. Dehydration: Care Instructions  Your Care Instructions  Dehydration happens when your body loses too much fluid. This might happen when you do not drink enough water or you lose large amounts of fluids from your body because of diarrhea, vomiting, or sweating. Severe dehydration can be life-threatening. Water and minerals called electrolytes help put your body fluids back in balance. Learn the early signs of fluid loss, and drink more fluids to prevent dehydration. Follow-up care is a key part of your treatment and safety. Be sure to make and go to all appointments, and call your doctor if you are having problems. It's also a good idea to know your test results and keep a list of the medicines you take. How can you care for yourself at home? · To prevent dehydration, drink plenty of fluids, enough so that your urine is light yellow or clear like water. Choose water and other caffeine-free clear liquids until you feel better. If you have kidney, heart, or liver disease and have to limit fluids, talk with your doctor before you increase the amount of fluids you drink. · If you do not feel like eating or drinking, try taking small sips of water, sports drinks, or other rehydration drinks. · Get plenty of rest.  To prevent dehydration  · Add more fluids to your diet and daily routine, unless your doctor has told you not to. · During hot weather, drink more fluids. Drink even more fluids if you exercise a lot. Stay away from drinks with alcohol or caffeine. · Watch for the symptoms of dehydration. These include:  ¨ A dry, sticky mouth. ¨ Dark yellow urine, and not much of it. ¨ Dry and sunken eyes. ¨ Feeling very tired. · Learn what problems can lead to dehydration. These include:  ¨ Diarrhea, fever, and vomiting. ¨ Any illness with a fever, such as pneumonia or the flu.   ¨ Activities that cause heavy sweating, such as endurance races and heavy outdoor work in hot or humid weather. ¨ Alcohol or drug abuse or withdrawal.  ¨ Certain medicines, such as cold and allergy pills (antihistamines), diet pills (diuretics), and laxatives. ¨ Certain diseases, such as diabetes, cancer, and heart or kidney disease. When should you call for help? Call 911 anytime you think you may need emergency care. For example, call if:  · You passed out (lost consciousness). Call your doctor now or seek immediate medical care if:  · You are confused and cannot think clearly. · You are dizzy or lightheaded, or you feel like you may faint. · You have signs of needing more fluids. You have sunken eyes and a dry mouth, and you pass only a little dark urine. · You cannot keep fluids down. Watch closely for changes in your health, and be sure to contact your doctor if:  · You are not making tears. · Your skin is very dry and sags slowly back into place after you pinch it. · Your mouth and eyes are very dry. Where can you learn more? Go to http://federica-theo.info/. Enter O898 in the search box to learn more about \"Dehydration: Care Instructions. \"  Current as of: March 20, 2017  Content Version: 11.3  © 0631-9420 Kod[employer]. Care instructions adapted under license by [employer] (which disclaims liability or warranty for this information). If you have questions about a medical condition or this instruction, always ask your healthcare professional. [place] [address] any warranty or liability for your use of this information.

## 2017-09-20 ENCOUNTER — ROUTINE PRENATAL (OUTPATIENT)
Dept: OBGYN CLINIC | Age: 32
End: 2017-09-20

## 2017-09-20 VITALS
BODY MASS INDEX: 37.05 KG/M2 | SYSTOLIC BLOOD PRESSURE: 133 MMHG | WEIGHT: 217 LBS | HEIGHT: 64 IN | DIASTOLIC BLOOD PRESSURE: 75 MMHG | HEART RATE: 101 BPM

## 2017-09-20 DIAGNOSIS — Z3A.34 PREGNANCY WITH 34 COMPLETED WEEKS GESTATION: Primary | ICD-10-CM

## 2017-09-20 NOTE — PATIENT INSTRUCTIONS

## 2017-09-20 NOTE — MR AVS SNAPSHOT
Visit Information Date & Time Provider Department Dept. Phone Encounter #  
 9/20/2017  4:15 PM Edna BeaulieuRocco [phone] [id number] Follow-up Instructions Return in about 2 weeks (around 10/4/2017). Follow-up and Disposition History Your Appointments 10/4/2017  4:00 PM  
OB VISIT with Edna Beaulieu MD  
[address] [place] [address] ([facility]) Appt Note: ob  
 [phone]  
[phone]  
  
   
 [facility] [phone] Upcoming Health Maintenance Date Due  
 OB 3RD TRIMESTER TDAP 7/31/2017 INFLUENZA AGE 9 TO ADULT 8/1/2017 PAP AKA CERVICAL CYTOLOGY 3/28/2020 Allergies as of 9/20/2017  Review Complete On: 9/20/2017 By: Elda Azevedo No Known Allergies Current Immunizations  Reviewed on 5/24/2016 Name Date Tdap 3/23/2016 Not reviewed this visit You Were Diagnosed With   
  
 Codes Comments Pregnancy with 34 completed weeks gestation    -  Primary ICD-10-CM: Z3A.34 
ICD-9-CM: V22.2 Vitals BP Pulse Height(growth percentile) Weight(growth percentile) LMP BMI  
 133/75 (!) 101 5' 4\" (1.626 m) 217 lb (98.4 kg) 01/14/2017 (Exact Date) 37.25 kg/m2 OB Status Smoking Status Pregnant Never Smoker BMI and BSA Data Body Mass Index Body Surface Area  
 37.25 kg/m 2 2.11 m 2 Preferred Pharmacy Pharmacy Name Phone [facility] [place], [place] [address] [phone] Your Updated Medication List  
  
   
This list is accurate as of: 9/20/17  4:48 PM.  Always use your most recent med list.  
  
  
  
  
 ondansetron 4 mg disintegrating tablet Commonly known as:  ZOFRAN ODT Take 1 Tab by mouth every eight (8) hours as needed for Nausea. Indications: EXCESSIVE VOMITING IN PREGNANCY PRENATAL MULTI PO Take  by mouth. sertraline 50 mg tablet Commonly known as:  ZOLOFT Take 1 Tab by mouth daily. Indications: ANXIETY WITH DEPRESSION Follow-up Instructions Return in about 2 weeks (around 10/4/2017). Patient Instructions Weeks 32 to 34 of Your Pregnancy: Care Instructions Your Care Instructions During the last few weeks of your pregnancy, you may have more aches and pains. It's important to rest when you can. Your growing baby is putting more pressure on your bladder. So you may need to urinate more often. Hemorrhoids are also common. These are painful, itchy veins in the rectal area. In the 36th week, most women have a test for group B streptococcus (GBS). GBS is a common bacteria that can live in the vagina and rectum. It can make your baby sick after birth. If you test positive, you will get antibiotics during labor. These will keep your baby from getting the bacteria. You may want to talk with your doctor about banking your baby's umbilical cord blood. This is the blood left in the cord after birth. If you want to save this blood, you must arrange it ahead of time. You can't decide at the last minute. If you haven't already had the Tdap shot during this pregnancy, talk to your doctor about getting it. It will help protect your  against pertussis infection. Follow-up care is a key part of your treatment and safety. Be sure to make and go to all appointments, and call your doctor if you are having problems. It's also a good idea to know your test results and keep a list of the medicines you take. How can you care for yourself at home? Ease hemorrhoids · Get more liquids, fruits, vegetables, and fiber in your diet. This will help keep your stools soft. · Avoid sitting for too long. Lie on your left side several times a day. · Clean yourself with soft, moist toilet paper. Or you can use witch hazel pads or personal hygiene pads. · If you are uncomfortable, try ice packs.  Or you can sit in a warm sitz bath. Do these for 20 minutes at a time, as needed. · Use hydrocortisone cream for pain and itching. Two examples are Anusol and Preparation H Hydrocortisone. · Ask your doctor about taking an over-the-counter stool softener. Consider breastfeeding · Experts recommend that women breastfeed for 1 year or longer. Breast milk is the perfect food for babies. · Breast milk is easier for babies to digest than formula. And it is always available, just the right temperature, and free. · In general, babies who are  are healthier than formula-fed babies. ¨  babies are less likely to get ear infections, colds, diarrhea, and pneumonia. ¨  babies who are fed only breast milk are less likely to get asthma and allergies. ¨  babies are less likely to be obese. ¨  babies are less likely to get diabetes or heart disease. · Women who breastfeed have less bleeding after the birth. Their uteruses also shrink back faster. · Some women who breastfeed lose weight faster. Making milk burns calories. · Breastfeeding can lower your risk of breast cancer, ovarian cancer, and osteoporosis. Decide about circumcision for boys · As you make this decision, it may help to think about your personal, Cheondoism, and family traditions. You get to decide if you will keep your son's penis natural or if he will be circumcised. · If you decide that you would like to have your baby circumcised, talk with your doctor. You can share your concerns about pain. And you can discuss your preferences for anesthesia. Where can you learn more? Go to http://federica-theo.info/. Enter R213 in the search box to learn more about \"Weeks 32 to 34 of Your Pregnancy: Care Instructions. \" Current as of: March 16, 2017 Content Version: 11.3 © 7635-1593 [employer].  Care instructions adapted under license by [employer] (which disclaims liability or warranty for this information). If you have questions about a medical condition or this instruction, always ask your healthcare professional. [address] any warranty or liability for your use of this information. Introducing [facility] HEALTH SERVICES! [facility] introduces [employer] patient portal. Now you can access parts of your medical record, email your doctor's office, and request medication refills online. 1. In your internet browser, go to [email or web address] 2. Click on the First Time User? Click Here link in the Sign In box. You will see the New Member Sign Up page. 3. Enter your [employer] Access Code exactly as it appears below. You will not need to use this code after youve completed the sign-up process. If you do not sign up before the expiration date, you must request a new code. · [employer] Access Code: [email or web address] Expires: 10/4/2017  3:43 PM 
 
4. Enter the last four digits of your Social Security Number (xxxx) and Date of Birth (mm/dd/yyyy) as indicated and click Submit. You will be taken to the next sign-up page. 5. Create a [employer] ID. This will be your [employer] login ID and cannot be changed, so think of one that is secure and easy to remember. 6. Create a [employer] password. You can change your password at any time. 7. Enter your Password Reset Question and Answer. This can be used at a later time if you forget your password. 8. Enter your e-mail address. You will receive e-mail notification when new information is available in [address] 9. Click Sign Up. You can now view and download portions of your medical record. 10. Click the Download Summary menu link to download a portable copy of your medical information. If you have questions, please visit the Frequently Asked Questions section of the [employer] website. Remember, [employer] is NOT to be used for urgent needs. For medical emergencies, dial 911. Now available from your iPhone and Android! Please provide this summary of care documentation to your next provider. Your primary care clinician is listed as June Junes. If you have any questions after today's visit, please call [phone].

## 2017-09-20 NOTE — PROGRESS NOTES
34w2d.  No OB issues. Pt. Notes she is tired. Denies LOF/VB  Recommended TdAP. Cultures and GC next visit. RTO 2 weeks.

## 2017-10-04 ENCOUNTER — ROUTINE PRENATAL (OUTPATIENT)
Dept: OBGYN CLINIC | Age: 32
End: 2017-10-04

## 2017-10-04 VITALS
SYSTOLIC BLOOD PRESSURE: 120 MMHG | WEIGHT: 220 LBS | BODY MASS INDEX: 37.56 KG/M2 | DIASTOLIC BLOOD PRESSURE: 72 MMHG | HEART RATE: 124 BPM | HEIGHT: 64 IN

## 2017-10-04 DIAGNOSIS — R12 HEARTBURN IN PREGNANCY, THIRD TRIMESTER: ICD-10-CM

## 2017-10-04 DIAGNOSIS — Z3A.36 PREGNANCY WITH 36 COMPLETED WEEKS GESTATION: ICD-10-CM

## 2017-10-04 DIAGNOSIS — Z3A.36 PREGNANCY WITH 36 COMPLETED WEEKS GESTATION: Primary | ICD-10-CM

## 2017-10-04 DIAGNOSIS — O26.893 HEARTBURN IN PREGNANCY, THIRD TRIMESTER: ICD-10-CM

## 2017-10-04 RX ORDER — RANITIDINE HCL 75 MG
150 TABLET ORAL 2 TIMES DAILY
Qty: 60 TAB | Refills: 2 | Status: SHIPPED | OUTPATIENT
Start: 2017-10-04 | End: 2021-08-17 | Stop reason: ALTCHOICE

## 2017-10-04 NOTE — PROGRESS NOTES
36w2d.  Pt. Has intense heartburn, worse with high CHO meals. Reviewed dietary choices. Rx for Zantac sent. GBS and cultures done. Labor precautions reviewed. RTO 1 week.

## 2017-10-04 NOTE — PATIENT INSTRUCTIONS

## 2017-10-05 LAB
CHLAMYDIA TRACHOMATIS, NAA, 180097: NEGATIVE
NEISSERIA GONORRHOEAE, NAA, 180104: NEGATIVE
TRICH VAG BY NAA, 180087: NEGATIVE

## 2017-10-07 LAB — CULTURE RESULT, SENTARA: NORMAL

## 2017-10-11 ENCOUNTER — ROUTINE PRENATAL (OUTPATIENT)
Dept: OBGYN CLINIC | Age: 32
End: 2017-10-11

## 2017-10-11 VITALS
SYSTOLIC BLOOD PRESSURE: 114 MMHG | HEIGHT: 64 IN | WEIGHT: 221 LBS | DIASTOLIC BLOOD PRESSURE: 77 MMHG | BODY MASS INDEX: 37.73 KG/M2 | HEART RATE: 93 BPM

## 2017-10-11 DIAGNOSIS — Z3A.37 PREGNANCY WITH 37 WEEKS COMPLETED GESTATION: Primary | ICD-10-CM

## 2017-10-11 DIAGNOSIS — Z3A.36 PREGNANCY WITH 36 COMPLETED WEEKS GESTATION: ICD-10-CM

## 2017-10-11 LAB — GRBS, EXTERNAL: NEGATIVE

## 2017-10-11 NOTE — PATIENT INSTRUCTIONS
Week 37 of Your Pregnancy: Care Instructions  Your Care Instructions    You are near the end of your pregnancy--and you're probably pretty uncomfortable. It may be harder to walk around. Lying down probably isn't comfortable either. You may have trouble getting to sleep or staying asleep. Most women deliver their babies between 40 and 41 weeks. This is a good time to think about packing a bag for the hospital with items you'll need. Then you'll be ready when labor starts. Follow-up care is a key part of your treatment and safety. Be sure to make and go to all appointments, and call your doctor if you are having problems. It's also a good idea to know your test results and keep a list of the medicines you take. How can you care for yourself at home? Learn about breastfeeding  · Breastfeeding is best for your baby and good for you. · Breast milk has antibodies to help your baby fight infections. · Mothers who breastfeed often lose weight faster, because making milk burns calories. · Learning the best ways to hold your baby will make breastfeeding easier. · Let your partner bathe and diaper the baby to keep your partner from feeling left out. Snuggle together when you breastfeed. · You may want to learn how to use a breast pump and store your milk. · If you choose to bottle feed, make the feeding feel like breastfeeding so you can bond with your baby. Always hold your baby and the bottle. Do not prop bottles or let your baby fall asleep with a bottle. Learn about crying  · It is common for babies to cry for 1 to 3 hours a day. Some cry more, some cry less. · Babies don't cry to make you upset or because you are a bad parent. · Crying is how your baby communicates. Your baby may be hungry; have gas; need a diaper change; or feel cold, warm, tired, lonely, or tense. Sometimes babies cry for unknown reasons. · If you respond to your baby's needs, he or she will learn to trust you.   · Try to stay calm when your baby cries. Your baby may get more upset if he or she senses that you are upset. Know how to care for your   · Your baby's umbilical cord stump will drop off on its own, usually between 1 and 2 weeks. To care for your baby's umbilical cord area:  ¨ Clean the area at the bottom of the cord 2 or 3 times a day. ¨ Pay special attention to the area where the cord attaches to the skin. ¨ Keep the diaper folded below the cord. ¨ Use a damp washcloth or cotton ball to sponge bathe your baby until the stump has come off. · Your baby's first dark stool is called meconium. After the meconium is passed, your baby will develop his or her own bowel pattern. ¨ Some babies, especially  babies, have several bowel movements a day. Others have one or two a day, or one every 2 to 3 days. ¨  babies often have loose, yellow stools. Formula-fed babies have more formed stools. ¨ If your baby's stools look like little pellets, he or she is constipated. After 2 days of constipation, call your baby's doctor. · If your baby will be circumcised, you can care for him at home. ¨ Gently rinse his penis with warm water after every diaper change. Do not try to remove the film that forms on the penis. This film will go away on its own. Pat dry. ¨ Put petroleum ointment, such as Vaseline, on the area of the diaper that will touch your baby's penis. This will keep the diaper from sticking to your baby. ¨ Ask the doctor about giving your baby acetaminophen (Tylenol) for pain. Where can you learn more? Go to http://federica-theo.info/. Enter 68 21 [id number] in the search box to learn more about \"Week 37 of Your Pregnancy: Care Instructions. \"  Current as of: 2017  Content Version: 11.3  © 5846-1695 [employer]. Care instructions adapted under license by [employer] (which disclaims liability or warranty for this information).  If you have questions about a medical condition or this instruction, always ask your healthcare professional. [place] [address] any warranty or liability for your use of this information.

## 2017-10-11 NOTE — PROGRESS NOTES
37w2d.  No OB issues. GERD improved. Notes increased nausea without vomiting. GBS and cultures negative. Labor precautions. RTO 1 week.

## 2017-10-18 ENCOUNTER — ROUTINE PRENATAL (OUTPATIENT)
Dept: OBGYN CLINIC | Age: 32
End: 2017-10-18

## 2017-10-18 VITALS
HEART RATE: 123 BPM | DIASTOLIC BLOOD PRESSURE: 85 MMHG | SYSTOLIC BLOOD PRESSURE: 119 MMHG | BODY MASS INDEX: 38.07 KG/M2 | HEIGHT: 64 IN | WEIGHT: 223 LBS

## 2017-10-18 DIAGNOSIS — Z3A.38 PREGNANCY WITH 38 COMPLETED WEEKS GESTATION: Primary | ICD-10-CM

## 2017-10-18 NOTE — MR AVS SNAPSHOT
Visit Information Date & Time Provider Department Dept. Phone Encounter #  
 10/18/2017  4:45 PM Rocco [phone] [id number] Follow-up Instructions Return in about 1 week (around 10/25/2017). Upcoming Health Maintenance Date Due  
 OB 3RD TRIMESTER TDAP 7/31/2017 INFLUENZA AGE 9 TO ADULT 8/1/2017 PAP AKA CERVICAL CYTOLOGY 3/28/2020 Allergies as of 10/18/2017  Review Complete On: 10/4/2017 By: Flash Payton LPN No Known Allergies Current Immunizations  Reviewed on 5/24/2016 Name Date Tdap 3/23/2016 Not reviewed this visit Vitals BP Pulse Height(growth percentile) Weight(growth percentile) LMP BMI  
 119/85 (!) 123 5' 4\" (1.626 m) 223 lb (101.2 kg) 01/14/2017 (Exact Date) 38.28 kg/m2 OB Status Smoking Status Pregnant Never Smoker BMI and BSA Data Body Mass Index Body Surface Area  
 38.28 kg/m 2 2.14 m 2 Preferred Pharmacy Pharmacy Name Phone [facility] [address] [facility] [place]  [phone] Your Updated Medication List  
  
   
This list is accurate as of: 10/18/17  5:24 PM.  Always use your most recent med list.  
  
  
  
  
 ondansetron 4 mg disintegrating tablet Commonly known as:  ZOFRAN ODT Take 1 Tab by mouth every eight (8) hours as needed for Nausea. Indications: EXCESSIVE VOMITING IN PREGNANCY PRENATAL MULTI PO Take  by mouth. raNITIdine 75 mg tablet Commonly known as:  ZANTAC Take 2 Tabs by mouth two (2) times a day. Indications: HEARTBURN  
  
 sertraline 50 mg tablet Commonly known as:  ZOLOFT Take 1 Tab by mouth daily. Indications: ANXIETY WITH DEPRESSION Follow-up Instructions Return in about 1 week (around 10/25/2017). Patient Instructions Week 38 of Your Pregnancy: Care Instructions Your Care Instructions Believe it or not, your baby is almost here. You may have ideas about your baby's personality because of how much he or she moves. Or you may have noticed how he or she responds to sounds, warmth, cold, and light. You may even know what kind of music your baby likes. By now, you have a better idea of what to expect during delivery. You may have talked about your birth preferences with your doctor. But even if you want a vaginal birth, it is a good idea to learn about  births.  birth means that your baby is born through a cut (incision) in your lower belly. It is sometimes the best choice for the health of the baby and the mother. This care sheet can help you understand  births. It also gives you information about what to expect after your baby is born. And it helps you understand more about postpartum depression. Follow-up care is a key part of your treatment and safety. Be sure to make and go to all appointments, and call your doctor if you are having problems. It's also a good idea to know your test results and keep a list of the medicines you take. How can you care for yourself at home? Learn about  birth · Most C-sections are unplanned. They are done because of problems that occur during labor. These problems might include: 
¨ Labor that slows or stops. ¨ High blood pressure or other problems for the mother. ¨ Signs of distress in the baby. These signs may include a very fast or slow heart rate. · Although most mothers and babies do well after , it is major surgery. It has more risks than a vaginal delivery. · In some cases, a planned  may be safer than a vaginal delivery. This may be the case if: ¨ The mother has a health problem, such as a heart condition. ¨ The baby isn't in a head-down position for delivery. This is called a breech position. ¨ The uterus has scars from past surgeries. This could increase the chance of a tear in the uterus. ¨ There is a problem with the placenta. ¨ The mother has an infection, such as genital herpes, that could be spread to the baby. ¨ The mother is having twins or more. ¨ The baby weighs 9 to 10 pounds or more. · Because of the risks of , planned C-sections generally should be done only for medical reasons. And a planned  should be done at 39 weeks or later unless there is a medical reason to do it sooner. Know what to expect after delivery, and plan for the first few weeks at home · You, your baby, and your partner or  will get identification bands. Only people with matching bands can  the baby from the nursery. · You will learn how to feed, diaper, and bathe your baby. And you will learn how to care for the umbilical cord stump. If your baby will be circumcised, you will also learn how to care for that. · Ask people to wait to visit you until you are at home. And ask them to wash their hands before they touch your baby. · Make sure you have another adult in your home for at least 2 or 3 days after the birth. · During the first 2 weeks, limit when friends and family can visit. · Do not allow visitors who have colds or infections. Make sure all visitors are up to date with their vaccinations. Never let anyone smoke around your baby. · Try to nap when the baby naps. Be aware of postpartum depression · \"Baby blues\" are common for the first 1 to 2 weeks after birth. You may cry or feel sad or irritable for no reason. · For some women, these feelings last longer and are more intense. This is called postpartum depression. · If your symptoms last for more than a few weeks or you feel very depressed, ask your doctor for help. · Postpartum depression can be treated. Support groups and counseling can help. Sometimes medicine can also help. Where can you learn more? Go to http://federica-theo.info/. Enter B044 in the search box to learn more about \"Week 38 of Your Pregnancy: Care Instructions. \" Current as of: March 16, 2017 Content Version: 11.3 © 2150-1725 [employer]. Care instructions adapted under license by [employer] (which disclaims liability or warranty for this information). If you have questions about a medical condition or this instruction, always ask your healthcare professional. [place] [address] any warranty or liability for your use of this information. Introducing [facility]! Brigido Madrigal introduces [employer] patient portal. Now you can access parts of your medical record, email your doctor's office, and request medication refills online. 1. In your internet browser, go to [email or web address] 2. Click on the First Time User? Click Here link in the Sign In box. You will see the New Member Sign Up page. 3. Enter your [employer] Access Code exactly as it appears below. You will not need to use this code after youve completed the sign-up process. If you do not sign up before the expiration date, you must request a new code. · [employer] Access Code: [email or web address] Expires: 1/14/2018  6:32 AM 
 
4. Enter the last four digits of your Social Security Number (xxxx) and Date of Birth (mm/dd/yyyy) as indicated and click Submit. You will be taken to the next sign-up page. 5. Create a [employer] ID. This will be your [employer] login ID and cannot be changed, so think of one that is secure and easy to remember. 6. Create a [employer] password. You can change your password at any time. 7. Enter your Password Reset Question and Answer. This can be used at a later time if you forget your password. 8. Enter your e-mail address. You will receive e-mail notification when new information is available in [address] 9. Click Sign Up. You can now view and download portions of your medical record. 10. Click the Download Summary menu link to download a portable copy of your medical information. If you have questions, please visit the Frequently Asked Questions section of the [employer] website. Remember, [employer] is NOT to be used for urgent needs. For medical emergencies, dial 911. Now available from your iPhone and Android! Please provide this summary of care documentation to your next provider. Your primary care clinician is listed as Carley Murphy. If you have any questions after today's visit, please call [phone].

## 2017-10-18 NOTE — PATIENT INSTRUCTIONS
Week 38 of Your Pregnancy: Care Instructions  Your Care Instructions    Believe it or not, your baby is almost here. You may have ideas about your baby's personality because of how much he or she moves. Or you may have noticed how he or she responds to sounds, warmth, cold, and light. You may even know what kind of music your baby likes. By now, you have a better idea of what to expect during delivery. You may have talked about your birth preferences with your doctor. But even if you want a vaginal birth, it is a good idea to learn about  births.  birth means that your baby is born through a cut (incision) in your lower belly. It is sometimes the best choice for the health of the baby and the mother. This care sheet can help you understand  births. It also gives you information about what to expect after your baby is born. And it helps you understand more about postpartum depression. Follow-up care is a key part of your treatment and safety. Be sure to make and go to all appointments, and call your doctor if you are having problems. It's also a good idea to know your test results and keep a list of the medicines you take. How can you care for yourself at home? Learn about  birth  · Most C-sections are unplanned. They are done because of problems that occur during labor. These problems might include:  ¨ Labor that slows or stops. ¨ High blood pressure or other problems for the mother. ¨ Signs of distress in the baby. These signs may include a very fast or slow heart rate. · Although most mothers and babies do well after , it is major surgery. It has more risks than a vaginal delivery. · In some cases, a planned  may be safer than a vaginal delivery. This may be the case if:  ¨ The mother has a health problem, such as a heart condition. ¨ The baby isn't in a head-down position for delivery. This is called a breech position.   ¨ The uterus has scars from past surgeries. This could increase the chance of a tear in the uterus. ¨ There is a problem with the placenta. ¨ The mother has an infection, such as genital herpes, that could be spread to the baby. ¨ The mother is having twins or more. ¨ The baby weighs 9 to 10 pounds or more. · Because of the risks of , planned C-sections generally should be done only for medical reasons. And a planned  should be done at 39 weeks or later unless there is a medical reason to do it sooner. Know what to expect after delivery, and plan for the first few weeks at home  · You, your baby, and your partner or  will get identification bands. Only people with matching bands can  the baby from the nursery. · You will learn how to feed, diaper, and bathe your baby. And you will learn how to care for the umbilical cord stump. If your baby will be circumcised, you will also learn how to care for that. · Ask people to wait to visit you until you are at home. And ask them to wash their hands before they touch your baby. · Make sure you have another adult in your home for at least 2 or 3 days after the birth. · During the first 2 weeks, limit when friends and family can visit. · Do not allow visitors who have colds or infections. Make sure all visitors are up to date with their vaccinations. Never let anyone smoke around your baby. · Try to nap when the baby naps. Be aware of postpartum depression  · \"Baby blues\" are common for the first 1 to 2 weeks after birth. You may cry or feel sad or irritable for no reason. · For some women, these feelings last longer and are more intense. This is called postpartum depression. · If your symptoms last for more than a few weeks or you feel very depressed, ask your doctor for help. · Postpartum depression can be treated. Support groups and counseling can help. Sometimes medicine can also help. Where can you learn more?   Go to http://federica-theo.info/. Enter B044 in the search box to learn more about \"Week 38 of Your Pregnancy: Care Instructions. \"  Current as of: March 16, 2017  Content Version: 11.3  © 0032-7167 [employer]. Care instructions adapted under license by [employer] (which disclaims liability or warranty for this information). If you have questions about a medical condition or this instruction, always ask your healthcare professional. [address] any warranty or liability for your use of this information.

## 2017-10-18 NOTE — PROGRESS NOTES
38w2d. No Ob issues. Denies LOF/Vb  Denies contractions. Labor precautions. Tdap and flu shot. RTO 1 week.

## 2017-10-19 ENCOUNTER — HOSPITAL ENCOUNTER (INPATIENT)
Age: 32
LOS: 2 days | Discharge: HOME | End: 2017-10-21
Attending: OBSTETRICS & GYNECOLOGY | Admitting: OBSTETRICS & GYNECOLOGY
Payer: COMMERCIAL

## 2017-10-19 ENCOUNTER — ANESTHESIA (OUTPATIENT)
Dept: LABOR AND DELIVERY | Age: 32
End: 2017-10-19
Payer: COMMERCIAL

## 2017-10-19 ENCOUNTER — ANESTHESIA EVENT (OUTPATIENT)
Dept: LABOR AND DELIVERY | Age: 32
End: 2017-10-19
Payer: COMMERCIAL

## 2017-10-19 LAB
A1 MICROGLOB PLACENTAL VAG QL: POSITIVE
ABO + RH BLD: NORMAL
BASOPHILS # BLD: 0 K/UL (ref 0–0.06)
BASOPHILS NFR BLD: 0 % (ref 0–2)
BLOOD GROUP ANTIBODIES SERPL: NORMAL
CONTROL LINE PRESENT?: YES
DIFFERENTIAL METHOD BLD: ABNORMAL
EOSINOPHIL # BLD: 0 K/UL (ref 0–0.4)
EOSINOPHIL NFR BLD: 0 % (ref 0–5)
ERYTHROCYTE [DISTWIDTH] IN BLOOD BY AUTOMATED COUNT: 12.8 % (ref 11.6–14.5)
EXPIRATION DATE: NORMAL
HCT VFR BLD AUTO: 38.1 % (ref 35–45)
HGB BLD-MCNC: 12.9 G/DL (ref 12–16)
INTERNAL NEGATIVE CONTROL: NEGATIVE
KIT LOT NO.: NORMAL
LYMPHOCYTES # BLD: 1.8 K/UL (ref 0.9–3.6)
LYMPHOCYTES NFR BLD: 31 % (ref 21–52)
MCH RBC QN AUTO: 31 PG (ref 24–34)
MCHC RBC AUTO-ENTMCNC: 33.9 G/DL (ref 31–37)
MCV RBC AUTO: 91.6 FL (ref 74–97)
MONOCYTES # BLD: 0.6 K/UL (ref 0.05–1.2)
MONOCYTES NFR BLD: 10 % (ref 3–10)
NEUTS SEG # BLD: 3.4 K/UL (ref 1.8–8)
NEUTS SEG NFR BLD: 59 % (ref 40–73)
PLATELET # BLD AUTO: 185 K/UL (ref 135–420)
PMV BLD AUTO: 12.3 FL (ref 9.2–11.8)
RBC # BLD AUTO: 4.16 M/UL (ref 4.2–5.3)
SPECIMEN EXP DATE BLD: NORMAL
WBC # BLD AUTO: 5.8 K/UL (ref 4.6–13.2)

## 2017-10-19 PROCEDURE — 74011250636 HC RX REV CODE- 250/636: Performed by: OBSTETRICS & GYNECOLOGY

## 2017-10-19 PROCEDURE — 74011000250 HC RX REV CODE- 250

## 2017-10-19 PROCEDURE — 65270000029 HC RM PRIVATE

## 2017-10-19 PROCEDURE — 77030020255 HC SOL INJ LR 1000ML BG

## 2017-10-19 PROCEDURE — 75410000003 HC RECOV DEL/VAG/CSECN EA 0.5 HR

## 2017-10-19 PROCEDURE — 74011250636 HC RX REV CODE- 250/636: Performed by: NURSE ANESTHETIST, CERTIFIED REGISTERED

## 2017-10-19 PROCEDURE — 85025 COMPLETE CBC W/AUTO DIFF WBC: CPT | Performed by: OBSTETRICS & GYNECOLOGY

## 2017-10-19 PROCEDURE — 84112 EVAL AMNIOTIC FLUID PROTEIN: CPT | Performed by: OBSTETRICS & GYNECOLOGY

## 2017-10-19 PROCEDURE — 77030034849

## 2017-10-19 PROCEDURE — 74011250636 HC RX REV CODE- 250/636

## 2017-10-19 PROCEDURE — 74011250637 HC RX REV CODE- 250/637: Performed by: OBSTETRICS & GYNECOLOGY

## 2017-10-19 PROCEDURE — 86900 BLOOD TYPING SEROLOGIC ABO: CPT | Performed by: OBSTETRICS & GYNECOLOGY

## 2017-10-19 PROCEDURE — 3E0S3BZ INTRODUCTION OF ANESTHETIC AGENT INTO EPIDURAL SPACE, PERCUTANEOUS APPROACH: ICD-10-PCS | Performed by: NURSE ANESTHETIST, CERTIFIED REGISTERED

## 2017-10-19 PROCEDURE — 77030007879 HC KT SPN EPDRL TELE -B: Performed by: NURSE ANESTHETIST, CERTIFIED REGISTERED

## 2017-10-19 RX ORDER — MAG HYDROX/ALUMINUM HYD/SIMETH 200-200-20
15 SUSPENSION, ORAL (FINAL DOSE FORM) ORAL
Status: DISCONTINUED | OUTPATIENT
Start: 2017-10-19 | End: 2017-10-19

## 2017-10-19 RX ORDER — AMOXICILLIN 250 MG
1 CAPSULE ORAL
Status: DISCONTINUED | OUTPATIENT
Start: 2017-10-19 | End: 2017-10-21 | Stop reason: HOSPADM

## 2017-10-19 RX ORDER — CARBOPROST TROMETHAMINE 250 UG/ML
250 INJECTION, SOLUTION INTRAMUSCULAR
Status: DISCONTINUED | OUTPATIENT
Start: 2017-10-19 | End: 2017-10-19

## 2017-10-19 RX ORDER — PROMETHAZINE HYDROCHLORIDE 25 MG/ML
25 INJECTION, SOLUTION INTRAMUSCULAR; INTRAVENOUS
Status: DISCONTINUED | OUTPATIENT
Start: 2017-10-19 | End: 2017-10-21 | Stop reason: HOSPADM

## 2017-10-19 RX ORDER — TERBUTALINE SULFATE 1 MG/ML
0.25 INJECTION SUBCUTANEOUS
Status: DISCONTINUED | OUTPATIENT
Start: 2017-10-19 | End: 2017-10-19

## 2017-10-19 RX ORDER — FENTANYL CITRATE 50 UG/ML
INJECTION, SOLUTION INTRAMUSCULAR; INTRAVENOUS
Status: COMPLETED
Start: 2017-10-19 | End: 2017-10-19

## 2017-10-19 RX ORDER — FENTANYL CITRATE 50 UG/ML
100 INJECTION, SOLUTION INTRAMUSCULAR; INTRAVENOUS ONCE
Status: COMPLETED | OUTPATIENT
Start: 2017-10-19 | End: 2017-10-19

## 2017-10-19 RX ORDER — MISOPROSTOL 200 UG/1
800 TABLET ORAL
Status: DISCONTINUED | OUTPATIENT
Start: 2017-10-19 | End: 2017-10-19

## 2017-10-19 RX ORDER — ONDANSETRON 2 MG/ML
4 INJECTION INTRAMUSCULAR; INTRAVENOUS
Status: DISCONTINUED | OUTPATIENT
Start: 2017-10-19 | End: 2017-10-19

## 2017-10-19 RX ORDER — OXYTOCIN/RINGER'S LACTATE 20/1000 ML
125 PLASTIC BAG, INJECTION (ML) INTRAVENOUS CONTINUOUS
Status: DISCONTINUED | OUTPATIENT
Start: 2017-10-19 | End: 2017-10-19

## 2017-10-19 RX ORDER — OXYTOCIN/RINGER'S LACTATE 20/1000 ML
500 PLASTIC BAG, INJECTION (ML) INTRAVENOUS ONCE
Status: DISCONTINUED | OUTPATIENT
Start: 2017-10-19 | End: 2017-10-19

## 2017-10-19 RX ORDER — OXYTOCIN 10 [USP'U]/ML
10 INJECTION, SOLUTION INTRAMUSCULAR; INTRAVENOUS
Status: DISCONTINUED | OUTPATIENT
Start: 2017-10-19 | End: 2017-10-19

## 2017-10-19 RX ORDER — SALICYLIC ACID
90 POWDER (GRAM) MISCELLANEOUS ONCE
Status: DISCONTINUED | OUTPATIENT
Start: 2017-10-19 | End: 2017-10-19

## 2017-10-19 RX ORDER — OXYTOCIN/RINGER'S LACTATE 20/1000 ML
PLASTIC BAG, INJECTION (ML) INTRAVENOUS
Status: COMPLETED
Start: 2017-10-19 | End: 2017-10-19

## 2017-10-19 RX ORDER — METHYLERGONOVINE MALEATE 0.2 MG/ML
0.2 INJECTION INTRAVENOUS AS NEEDED
Status: DISCONTINUED | OUTPATIENT
Start: 2017-10-19 | End: 2017-10-19

## 2017-10-19 RX ORDER — HYDROMORPHONE HYDROCHLORIDE 2 MG/ML
1 INJECTION, SOLUTION INTRAMUSCULAR; INTRAVENOUS; SUBCUTANEOUS
Status: DISCONTINUED | OUTPATIENT
Start: 2017-10-19 | End: 2017-10-19

## 2017-10-19 RX ORDER — FENTANYL CITRATE 50 UG/ML
INJECTION, SOLUTION INTRAMUSCULAR; INTRAVENOUS AS NEEDED
Status: DISCONTINUED | OUTPATIENT
Start: 2017-10-19 | End: 2017-10-19 | Stop reason: HOSPADM

## 2017-10-19 RX ORDER — ACETAMINOPHEN 325 MG/1
650 TABLET ORAL
Status: DISCONTINUED | OUTPATIENT
Start: 2017-10-19 | End: 2017-10-21 | Stop reason: HOSPADM

## 2017-10-19 RX ORDER — LIDOCAINE HYDROCHLORIDE AND EPINEPHRINE 15; 5 MG/ML; UG/ML
INJECTION, SOLUTION EPIDURAL AS NEEDED
Status: DISCONTINUED | OUTPATIENT
Start: 2017-10-19 | End: 2017-10-19 | Stop reason: HOSPADM

## 2017-10-19 RX ORDER — NALBUPHINE HYDROCHLORIDE 10 MG/ML
10 INJECTION, SOLUTION INTRAMUSCULAR; INTRAVENOUS; SUBCUTANEOUS
Status: DISCONTINUED | OUTPATIENT
Start: 2017-10-19 | End: 2017-10-19

## 2017-10-19 RX ORDER — SERTRALINE HYDROCHLORIDE 50 MG/1
50 TABLET, FILM COATED ORAL DAILY
Status: DISCONTINUED | OUTPATIENT
Start: 2017-10-19 | End: 2017-10-20

## 2017-10-19 RX ORDER — IBUPROFEN 400 MG/1
800 TABLET ORAL
Status: DISCONTINUED | OUTPATIENT
Start: 2017-10-19 | End: 2017-10-21 | Stop reason: HOSPADM

## 2017-10-19 RX ORDER — SODIUM CHLORIDE, SODIUM LACTATE, POTASSIUM CHLORIDE, CALCIUM CHLORIDE 600; 310; 30; 20 MG/100ML; MG/100ML; MG/100ML; MG/100ML
125 INJECTION, SOLUTION INTRAVENOUS CONTINUOUS
Status: DISCONTINUED | OUTPATIENT
Start: 2017-10-19 | End: 2017-10-19

## 2017-10-19 RX ORDER — LIDOCAINE HYDROCHLORIDE 10 MG/ML
30 INJECTION, SOLUTION EPIDURAL; INFILTRATION; INTRACAUDAL; PERINEURAL AS NEEDED
Status: DISCONTINUED | OUTPATIENT
Start: 2017-10-19 | End: 2017-10-19

## 2017-10-19 RX ORDER — OXYCODONE AND ACETAMINOPHEN 5; 325 MG/1; MG/1
1 TABLET ORAL
Status: DISCONTINUED | OUTPATIENT
Start: 2017-10-19 | End: 2017-10-21 | Stop reason: HOSPADM

## 2017-10-19 RX ORDER — ZOLPIDEM TARTRATE 5 MG/1
5 TABLET ORAL
Status: DISCONTINUED | OUTPATIENT
Start: 2017-10-19 | End: 2017-10-21 | Stop reason: HOSPADM

## 2017-10-19 RX ADMIN — IBUPROFEN 800 MG: 400 TABLET, FILM COATED ORAL at 11:41

## 2017-10-19 RX ADMIN — IBUPROFEN 800 MG: 400 TABLET, FILM COATED ORAL at 20:55

## 2017-10-19 RX ADMIN — FENTANYL CITRATE 100 MCG: 50 INJECTION, SOLUTION INTRAMUSCULAR; INTRAVENOUS at 04:56

## 2017-10-19 RX ADMIN — SODIUM CHLORIDE, SODIUM LACTATE, POTASSIUM CHLORIDE, AND CALCIUM CHLORIDE 1000 ML: 600; 310; 30; 20 INJECTION, SOLUTION INTRAVENOUS at 03:15

## 2017-10-19 RX ADMIN — SODIUM CHLORIDE, SODIUM LACTATE, POTASSIUM CHLORIDE, AND CALCIUM CHLORIDE 125 ML/HR: 600; 310; 30; 20 INJECTION, SOLUTION INTRAVENOUS at 04:10

## 2017-10-19 RX ADMIN — Medication 125 ML/HR: at 07:23

## 2017-10-19 RX ADMIN — FENTANYL CITRATE 100 MCG: 50 INJECTION, SOLUTION INTRAMUSCULAR; INTRAVENOUS at 04:57

## 2017-10-19 RX ADMIN — LIDOCAINE HYDROCHLORIDE AND EPINEPHRINE 5 ML: 15; 5 INJECTION, SOLUTION EPIDURAL at 04:55

## 2017-10-19 RX ADMIN — Medication 20000 MILLI-UNITS: at 08:47

## 2017-10-19 RX ADMIN — Medication 10 ML/HR: at 05:00

## 2017-10-19 NOTE — IP AVS SNAPSHOT
[address] [id number] Patient: Charity Torres MRN: [id number] DEANNA:[id number] Current Discharge Medication List  
  
START taking these medications Dose & Instructions Dispensing Information Comments Morning Noon Evening Bedtime  
 ibuprofen 600 mg tablet Commonly known as:  MOTRIN Your last dose was: Your next dose is:    
   
   
 Dose:  600 mg Take 1 Tab by mouth every six (6) hours as needed. Quantity:  30 Tab Refills:  0 CONTINUE these medications which have NOT CHANGED Dose & Instructions Dispensing Information Comments Morning Noon Evening Bedtime PRENATAL MULTI PO Your last dose was: Your next dose is: Take  by mouth. Refills:  0  
     
   
   
   
  
 raNITIdine 75 mg tablet Commonly known as:  ZANTAC Your last dose was: Your next dose is:    
   
   
 Dose:  150 mg Take 2 Tabs by mouth two (2) times a day. Indications: HEARTBURN Quantity:  60 Tab Refills:  2 STOP taking these medications   
 ondansetron 4 mg disintegrating tablet Commonly known as:  ZOFRAN ODT  
   
  
 sertraline 50 mg tablet Commonly known as:  ZOLOFT Where to Get Your Medications Information on where to get these meds will be given to you by the nurse or doctor. ! Ask your nurse or doctor about these medications  
  ibuprofen 600 mg tablet

## 2017-10-19 NOTE — PROGRESS NOTES
Baby announcement.     [address] [facility]   Staff [address] [facility]   [phone]

## 2017-10-19 NOTE — LACTATION NOTE
Mom states baby nursed well earlier, has been sleepy since then. Mom also tried a bottle but, baby not interested. Mom's plan is to nurse and bottle feed until her milk comes in. Discussed nursing pattern and expectations, size of stomach. Experienced mom, breast fed one other child. Encouraged exclusive breastfeeding. Offered help with feedings. No questions at this time.

## 2017-10-19 NOTE — ADT AUTH CERT NOTES
Patient Demographics        Patient Name [address] Sex  Address Phone       Nathen Petersen [phone] Female 1985 [address] [place] [address] [phone] (Home)  [phone] (Work)  [phone] (Mobile)           CSN:       [id number]           Admit Date: Admit Time Room Bed       Oct 19, 2017  3:00 AM 3405 [[id number]] 01 [[id number]]           Attending Providers        Provider Pager From Omari Hernandez,   10/19/17            ADM10/19 VAG DEL 10/19  Birth History   Birth Information   Birth Length: 54.6 cm   Birth Weight: 3.565 kg   Birth Head Circ: 36 cm   Gestational Age: 25 6/7 weeks   Delivery Method: Vaginal, Spontaneous Delivery    APGARs   1 Minute: 8   5 Minute: 9

## 2017-10-19 NOTE — IP AVS SNAPSHOT
[address] Patient: Delfino Ritter MRN: [id number] YND:[birth year] You are allergic to the following No active allergies Immunizations Administered for This Admission Name Date Influenza Vaccine (Quad) PF 10/21/2017 Tdap 10/21/2017 Recent Documentation Height Weight Breastfeeding? BMI OB Status Smoking Status 1.626 m 99.8 kg Yes 37.76 kg/m2 Recent pregnancy Never Smoker Emergency Contacts Name Discharge Info Relation Home Work Mobile Walter Finley DISCHARGE CAREGIVER [3] Spouse [3] [phone] Niurka Bose DISCHARGE CAREGIVER [3] Mother [14] [phone] About your hospitalization You were admitted on:  October 19, 2017 You last received care in the:  [place] [address] You were discharged on:  October 21, 2017 Unit phone number:  [phone] Why you were hospitalized Your primary diagnosis was:  Not on File Your diagnoses also included:  Labor And Delivery, Indication For Care Providers Seen During Your Hospitalizations Provider Role Specialty Primary office phone Ana Gardiner DO Attending Provider Obstetrics & Gynecology [phone] Your Primary Care Physician (PCP) Primary Care Physician Office Phone Office [place]ax [facility] [place] [phone] Follow-up Information Follow up With Details Comments Contact Info Ana Gardiner DO Schedule an appointment as soon as possible for a visit in 6 weeks Routine postpartum visit [address] [facility] [address] [facility] [id number] 
[phone] Bull Alba MD   [address] [place] [address],[address] [place] [address] 
[phone] Your Appointments Wednesday October 25, 2017  4:30 PM EDT  
OB VISIT with Nessa Crespo MD  
[address] [place] [address] ([address]) [address] [facility] [id number] [phone] Current Discharge Medication List  
  
START taking these medications Dose & Instructions Dispensing Information Comments Morning Noon Evening Bedtime  
 ibuprofen 600 mg tablet Commonly known as:  MOTRIN Your last dose was: Your next dose is:    
   
   
 Dose:  600 mg Take 1 Tab by mouth every six (6) hours as needed. Quantity:  30 Tab Refills:  0 CONTINUE these medications which have NOT CHANGED Dose & Instructions Dispensing Information Comments Morning Noon Evening Bedtime PRENATAL MULTI PO Your last dose was: Your next dose is: Take  by mouth. Refills:  0  
     
   
   
   
  
 raNITIdine 75 mg tablet Commonly known as:  ZANTAC Your last dose was: Your next dose is:    
   
   
 Dose:  150 mg Take 2 Tabs by mouth two (2) times a day. Indications: HEARTBURN Quantity:  60 Tab Refills:  2 STOP taking these medications   
 ondansetron 4 mg disintegrating tablet Commonly known as:  ZOFRAN ODT  
   
  
 sertraline 50 mg tablet Commonly known as:  ZOLOFT Where to Get Your Medications Information on where to get these meds will be given to you by the nurse or doctor. ! Ask your nurse or doctor about these medications  
  ibuprofen 600 mg tablet Discharge Instructions CONGRATULATIONS ON THE BIRTH OF YOUR BABY! The first six weeks after childbirth is a time of physical and emotional adjustment. This handout will help to answer questions and provide guidance during the postpartum period. Every family's adjustment is unique, so please call if you have further concerns. At anytime we can be reached at [phone]. During office hours please ask to speak to a nurse.   After hours, the answering service will take a message and the doctor on-call will return your call. If your question can wait until office hours: Monday-Friday 8:30-4:00, please do so. For emergencies or urgent concerns do not hesitate to call us after hours. DIET Your body is in need of a well-balanced, high protein diet to recuperate from birth. Please continue to take your prenatal vitamins for 6 weeks or as long as you are breastfeeding. Continue to drink at least 6-8 cups of water or other liquid a day. A breastfeeding mother also needs extra protein, calories and calcium containing foods. It is a good rule to drink fluids with every feeding in order to maintain an adequate milk supply and avoid dehydration. Your baby will probably not be bothered by things in your diet, but if the baby seems extremely fussy or develops a rash, you may want to discuss possible food intolerances with your baby's care provider. PAIN MEDICATIONS Acetaminophen (Tylenol), ibuprofen (Motrin), or other prescribed pain medication may be taken as directed to relieve discomfort. The above medications pass in very minimal amounts into the breast milk and usually will not cause problems. There are medications that may affect the baby, so please consult your baby's care provider before taking medication. If you are breastfeeding, be sure to mention this to any care provider you see so that medications that are safe may be selected. There is an excellent resource called [employer] that is a resource for medication safety in pregnancy and lactation. You can visit their website at TuneWiki. org/ or call them toll free at 485-997-0290 if you have any questions about medication safety. UTERINE INVOLUTION / VAGINAL BLEEDING Involution is the process of the uterus returning to pre-pregnant size. It will take approximately six weeks for this process to occur.  To achieve this size your uterus becomes firm to slow bleeding loss from the placental site. The first 7 days after birth, the bleeding is red and heavy. It may change with your activity and position. Some small clots are normal.   After ten days, the bleeding should be pale pink and slowed considerably. The next several weeks may progress to a pink, mucousy discharge. This may continue for 6-8 weeks, depending on your activity. During the first four weeks after delivery we recommend using sanitary pads instead of tampons. Douching should also be avoided, but it is fine to take a tub bath so long as the tub is very clean. ACTIVITY/EXERCISE Adequate rest is essential to recovery. Try to rest or sleep when the baby sleeps. After two weeks, you may begin going for short walks, doing Kegel exercises and abdominal crunches. Avoid heavy, jarring or aerobic exercises. Remember to start out slowly and build up to your previous fitness level. Use common sense and don't overdo as rest is important and the benefits of increased rest are a quicker recovery. For the first two weeks after a  try to limit trips up or down steps. Do not lift anything heavier than the baby during this time. Lifting the baby or other objects should be done by bending at the knees rather than the waist.  Driving should be avoided during the first two to three weeks until you have the strength to push firmly on the brakes in case of an emergency. You may ride as a passenger, but DO wear a seat belt at all times. After a few weeks, you may resume normal activity at whatever pace is comfortable for you. Exercise may also be resumed gradually. Walking is a good way to start. Finally, try to be reasonable in your expectations. Caring for a new baby after major surgery can be quite trying. Arrange for assistance at home to ensure that you get enough rest.  
 
POSTPARTUM CHECK You may call the office when you return home to set up a postpartum visit. Most patients will be seen at 6 weeks after delivery, but after a  or other circumstances you may be seen in 2 weeks or less. If you are discharged from the hospital with staples that must be removed, you will be asked to come in sooner. At your postpartum visit, a pelvic exam may be performed. If you are having any problems or concerns, please do not hesitate to call. Once again our number is [phone]. MOOD CHANGES Significant hormonal changes occur in the days following delivery, and as a result, many women experience brief episodes of tearfulness or feeling \"blue. \"  These emotional swings may be made worse by lack of sleep and by the adjustments inherent in becoming a mother. For some women, these fluctuations are minor. For others, they are overwhelming; creating feelings of anxiety, depression, or the inability to cope. If you have difficulty functioning as a result of feeling down, or if the mood changes seem severe, do not improve, or result is thoughts of harming yourself or others CALL RIGHT AWAY. PERINEAL CARE The basic goals of perineal care are to prevent infection, to relieve pain and promote healing. Your stitches will dissolve in four to six weeks, and do not need to be removed. After urinating, please continue to clean with warm water from front to back. Please continue sitz baths as instructed twice a day for a week or as needed. Call the office if you see pus in the suture site, or have unusual or severe swelling or pain that seems to be getting worse. INCISION CARE If you had a , clean and dry the incision gently as you would the rest of your body. Washing over the area with soap and water, and showering are fine. If steri-strips are present they will gradually come off with time. Tub baths are permitted.   You may experience numbness and burning in the area surrounding the incision which usually resolves gradually over the next several weeks or months. RETURN OF MENSTRUATION Your first menstrual period may occur as soon as four to six weeks after your delivery if you are not breast-feeding. If breast-feeding it is more difficult to predict when your first period will occur. Even if you are not yet menstruating, you may be ovulating and it may be possible to conceive again. It is common for your first period after childbirth to be very heavy with an increased amount of cramping. BREASTS Breast-feeding Mothers: Colostrum is excreted in the first 24-72 hours. Mature breast milk will appear on the 2nd to 5th day. Engorgement may occur with the mature milk making your breasts feel warm and very full. Frequent feedings will make you more comfortable. Babies do not nurse on regular schedules. Nursing every 1 1/2 to 2 hours is normal and frequent feeding DOES NOT mean you are not making enough milk. To avoid nipple confusion, do not give bottles for the first 4 weeks. Growth spurts are common and may require more frequent feedings. This is the way baby increases your milk supply. During a growth spurt, you may feel you are feeding very frequently and that your breasts are \"empty. \"  Don't worry, your milk is produced by supply and demand so this increased frequency of feeding will increase your milk supply within 48 hours. Sore nipples may occur with frequent feedings and are sometimes also caused by improper latch. Check for a proper latch. Baby should have a wide open mouth. Use different positions at each feeding if possible. Express a small amount of colostrum or breast milk onto the sore area and leave bra flaps unlatched until dry. The lactation consultant at [facility] is available for outpatient consultation without charge. Call [phone] from Monday-Friday 9:00am- 3:00pm to arrange an outpatient appointment with her. Local [facility] and consultants may also be very helpful. If You Are Not Breast-feeding: You will experience swelling, engorgement and some milk production. There are no safe medications available to stop lactation. Some remedies for engorgement include: wearing a tight bra, ice packs and cold green cabbage leaves placed between the breast and your bra. Change these frequently. Tylenol or Motrin should help with the discomfort. SEXUAL ADJUSTMENTS We recommend that you wait at least four weeks before resuming sexual intercourse. A sore perineum, a demanding baby and fatigue will certainly affect your ability to enjoy lovemaking! A vaginal lubricant is recommended to help with any dryness. It is very important to remember that you will ovulate BEFORE your first period and can conceive. If you do not wish another pregnancy right away, please take precautions to avoid pregnancy. If you would like a prescription method of birth control, please discuss this with us at your 6 week visit. ELIMINATION We remind all postpartum patients that it may take a few days for your bowels to return to normal, especially if you had a long labor. For those who had C-sections or severe lacerations, we recommend that you use a stool softener twice daily for at least two weeks. Many stool softeners are over-the-counter. Colace (Docusate Sodium) is recommended. Bulk forming agents such as Metamucil or Fibercon may be used daily in addition to a stool softener to promote regular bowel movements. Eating fresh fruits and vegetables along with whole grains is helpful as well. Do not be afraid to have a bowel movement as your stitches will not \"come out\" in the course of having a bowel movement. Urination may be difficult due to soreness around the urethra, or as an after effect of epidural.  This is temporary and can be helped  by squirting water over the perineum or try going in the shower. Hemorrhoids are common after birth.   Tucks pads, Anusol cream and avoiding constipation are helpful. If constipation does occur, you may take Milk of Magnesia or Senekot according to the package instructions. DANGER SIGNS! CALL WITHOUT DELAY IF YOU ARE EXPERIENCING ANY OF THE FOLLOWING: 
* Unusually heavy bleeding, soaking more than 1 or more pads in an hour. * Vaginal discharge with strong foul odor. * Fever of 101 or higher * Unusual pain or tenderness in the abdominal area. * If breasts are red, hot or have a painful lump. * Depression that persists longer than 1-2 weeks or is severe. * Any urinary frequency accompanied by urgency or pain. * A lump in leg or calf especially if painful, warm or red. We thank you for choosing us for your prenatal care and/or delivery. We wish you all happiness and health with your baby for his or her lifetime! Eleazar Christie, DO Discharge Instructions Attachments/References BABY-FRIENDLY BREASTFEEDING: GENERAL INFO (ENGLISH) BOTTLE-FEEDING (ENGLISH) DEPRESSION: POSTPARTUM (ENGLISH) Discharge Orders None [facility] Announcement We are excited to announce that we are making your provider's discharge notes available to you in Beautylish. You will see these notes when they are completed and signed by the physician that discharged you from your recent hospital stay. If you have any questions or concerns about any information you see in Beautylish, please call the Health Information Department where you were seen or reach out to your Primary Care Provider for more information about your plan of care. Introducing [facility]! [employer] introduces Beautylish patient portal. Now you can access parts of your medical record, email your doctor's office, and request medication refills online. 1. In your internet browser, go to [email or web address] 2. Click on the First Time User? Click Here link in the Sign In box. You will see the New Member Sign Up page. 3. Enter your SeatGeek Access Code exactly as it appears below. You will not need to use this code after youve completed the sign-up process. If you do not sign up before the expiration date, you must request a new code. · SeatGeek Access Code: [email or web address] Expires: 1/14/2018  6:32 AM 
 
4. Enter the last four digits of your Social Security Number (xxxx) and Date of Birth (mm/dd/yyyy) as indicated and click Submit. You will be taken to the next sign-up page. 5. Create a SeatGeek ID. This will be your SeatGeek login ID and cannot be changed, so think of one that is secure and easy to remember. 6. Create a SeatGeek password. You can change your password at any time. 7. Enter your Password Reset Question and Answer. This can be used at a later time if you forget your password. 8. Enter your e-mail address. You will receive e-mail notification when new information is available in [address]. 9. Click Sign Up. You can now view and download portions of your medical record. 10. Click the Download Summary menu link to download a portable copy of your medical information. If you have questions, please visit the Frequently Asked Questions section of the SeatGeek website. Remember, SeatGeek is NOT to be used for urgent needs. For medical emergencies, dial 911. Now available from your iPhone and Android! General Information Please provide this summary of care documentation to your next provider. Patient Signature:  ____________________________________________________________ Date:  ____________________________________________________________  
  
Meghann Rice Provider Signature:  ____________________________________________________________ Date:  ____________________________________________________________ More Information Learning About Starting to Breastfeed Planning ahead Before your baby is born, plan ahead.  Learn all you can about breastfeeding. This helps make breastfeeding easier. · Early in your pregnancy, talk to your doctor or midwife about breastfeeding. · Learn the basics before your baby is born. The staff at hospitals and birthing centers can help you find a lactation specialist. This person is often a nurse who has been trained to teach and advise women about breastfeeding. Or you can take a breastfeeding class. · Plan ahead for times when you will need help after your baby is born. Many women get help from friends and family. Some join a support group to talk to other moms who breastfeed. · Buy the equipment you'll need. Examples are breast pads, nipple cream, extra pillows, and nursing bras. Find out about breast pumps too. Getting help from your hospital or birthing center It's important to have support from the doctors, nurses, and hospital staff who care for you and your baby. Before it's time for you to give birth, ask about the breastfeeding policies at your hospital or birthing center. Look for a hospital or birthing center that has policies for: · \"Rooming in. \" This policy encourages you to have your baby in the room with you. It can allow you to breastfeed more often. · Supplemental feedings. Tell the staff that your baby is to get only your breast milk from birth. If staff feed your baby water, sugar solution, or formula right after birth without a medical reason, it may make it harder for you to breastfeed. · Pacifiers or artificial nipples. Staff should not give your  these items without your permission. They may interfere with breastfeeding. · Follow-up. Find out if your hospital can help you with breastfeeding issues after you go home. See if you can get information on support groups or other contacts. They might help if you need help setting up and staying with your breastfeeding routine. Your first feeding It's best to start breastfeeding within 1 hour of birth.  For each feeding, you go through these basic steps: · Get ready for the feeding. Be calm and relaxed, and try not to be distracted. Get some water or juice for yourself. Use two or three pillows to help support your baby while he or she is nursing. · Find a breastfeeding position that is comfortable for you and your baby. Examples are the cradle and the football positions. Make sure the baby's head and chest are lined up straight and facing your breast. It's best to switch which breast you start with each time. · Get the baby latched on well. Your baby's mouth needs to be wide open, like a yawn, so you may need to gently touch the middle of your baby's lower lip. When your baby's mouth is open wide, quickly bring the baby onto your nipple and areola. The areola is the dark [place] around your nipple. · Provide a complete feeding. Let your baby nurse for at least 15 minutes. Be sure to burp your baby after each breast. 
In the first days after birth, your breasts make a thick, yellow liquid called colostrum. This liquid gives your baby nutrients and antibodies against infection. It is all that babies need at first. Your breasts will fill with milk a few days after the birth. Talk to your doctor, midwife, or lactation specialist right away if you are having problems and aren't sure what to do. How often to breastfeed Plan to breastfeed your baby on demand rather than setting a strict schedule. For the first few days, be prepared to breastfeed every 1 to 3 hours. That often works out to about 8 to 12 times in a 24-hour period. Wake a sleeping baby to feed, if you need to. If you breastfeed more often, it will help your breasts to produce more milk. After you go home After you're home, don't be afraid to call your doctor, midwife, or lactation specialist with questions. That's true even if you don't know what's bothering you. They are used to parents of newborns calling.  They can help you figure out if there is a problem, and if so, how to fix it. Plan for times when you will be apart from your baby. Use a breast pump to collect breast milk ahead of time. You can store milk in the refrigerator or freezer. Then it's ready when someone else will be taking care of your baby. Breastfeeding is a learned skill that gets easier over time. You are more likely to succeed if you plan ahead, learn the basic techniques, and know where to get help and support. Where can you learn more? Go to http://federica-theo.info/. Enter X256 in the search box to learn more about \"Learning About Starting to Breastfeed. \" Current as of: 2017 Content Version: 11.3 © 3852-1990 [employer]. Care instructions adapted under license by [employer] (which disclaims liability or warranty for this information). If you have questions about a medical condition or this instruction, always ask your healthcare professional. [place] [address] any warranty or liability for your use of this information. Bottle-Feeding: Care Instructions Your Care Instructions Your reasons for wanting to bottle-feed your baby with formula are personal. You and your partner can make the best decision for you and your baby. Formulas can provide all the calories and nutrients your baby needs in the first 6 months of life. Several types of formulas are available. Most babies start with a cow's milkbased formula, such as Enfamil, Good Start, or Similac. Talk to your doctor before trying other types of formulas, which include soy and lactose-free formulas. At first, preparing the bottles and formula can seem confusing, but it gets easier and faster with some practice. Your  baby probably will want to eat every 2 to 3 hours.  Do not worry about the exact timing for the first few weeks, but feed your baby whenever he or she is hungry. In general, your baby should not go longer than 4 hours without eating during the day for the first few months. Sit in a comfortable chair with your arms supported on pillows. Look into your baby's eyes and talk or sing while you are giving the bottle. Enjoy this special time you have with your baby. Follow-up care is a key part of your child's treatment and safety. Be sure to make and go to all appointments, and call your doctor if your child is having problems. It's also a good idea to know your child's test results and keep a list of the medicines your child takes. At each well-baby visit, talk to your doctor about your baby's nutritional needs, which change as he or she grows and develops. How can you care for yourself at home? · Prepare your supplies for bottle-feeding before your baby is born, if possible. ¨ Have a supply of small bottles (usually 4 ounces) for your baby's first few weeks. ¨ You may want to buy a variety of bottle nipples so you can see which type your baby likes. ¨ Before using bottles and nipples the first time, wash them in hot water and dish soap and rinse with hot water. · Ask your doctor which formula to use. You can buy formula as a liquid concentrate or a powder that you mix with water. Formulas also come in a ready-to-feed form. Always use formula with added iron unless the doctor says not to. · Make sure you have clean, safe water to mix with the formula. If you are not sure if your water is safe, you can use bottled water or you can boil tap water. ¨ Boil cold tap water for 1 minute, then cool the water to room temperature. ¨ Use the boiled water to mix the formula within 30 minutes. · Wash your hands before preparing formula. · Read the label to see how much water to mix with the formula. If you add too little water, it can upset your baby's stomach. If you add too much water, your baby will not get the right nutrition. · Cover the prepared formula and store it in a refrigerator. Use it within 24 hours. · Soak dirty baby bottles in water and dish soap. Wash bottles and nipples in the upper rack of the  or hand-wash them in hot water with dish soap. To bottle-feed your baby · Warm the formula to room temperature or body temperature before feeding. The best way to warm it is in a bowl of heated water. Do not use a microwave, which can cause hot spots in the formula that can burn your baby's mouth. · Before feeding your baby, check the temperature of the formula by dripping 2 or 3 drops on the inside of your wrist. It should be warm, not cold or hot. · Place a bib or cloth under your baby's chin to help keep clothes clean. Have a second cloth handy to use when burping your baby. · Support your baby with one arm, with your baby's head resting in the bend of your elbow. Keep your baby's head higher than his or her chest. 
· Stroke the center of your baby's lower lip to encourage the mouth to open wider. A wide mouth will cover more of the nipple and will help reduce the amount of air the baby sucks in. · Angle the bottle so the neck of the bottle and the nipple stay full of milk. This helps reduce how much air your baby swallows. · Do not prop the bottle in your baby's mouth or let him or her hold it alone. This increases your baby's chances of choking or getting ear infections. · During the first few weeks, burp your baby after every 2 ounces of formula. This helps get rid of swallowed air and reduces spitting up. · You will know your baby is full when he or she stops sucking. Your baby may spit out the nipple, turn his or her head away, or fall asleep when full.  babies usually drink from 1 to 3 ounces each feeding. · Throw away any formula left in the bottle after you have fed your baby. Bacteria can grow in the leftover formula.  
· It can be helpful to hold your baby upright for about 30 minutes after eating to reduce spitting up. When should you call for help? Watch closely for changes in your child's health, and be sure to contact your doctor if: 
· Your child does not seem to be growing and gaining weight. · Your child has trouble passing stools, or his or her stools are hard and dry. · Your child is vomiting. · Your child has diarrhea or a skin rash. · Your child cries most of the time. · Your child has gas, bloating, or cramps after drinking a bottle. Where can you learn more? Go to http://federica-theo.info/. Enter P111 in the search box to learn more about \"Bottle-Feeding: Care Instructions. \" Current as of: July 26, 2016 Content Version: 11.3 © 4470-7894 [employer]. Care instructions adapted under license by [employer] (which disclaims liability or warranty for this information). If you have questions about a medical condition or this instruction, always ask your healthcare professional. [place] [address] any warranty or liability for your use of this information. Depression After Childbirth: Care Instructions Your Care Instructions Many women get the \"baby blues\" during the first few days after childbirth. You may lose sleep, feel irritable, and cry easily. You may feel happy one minute and sad the next. Hormone changes are one cause of these emotional changes. Also, the demands of a new baby, along with visits from relatives or other family needs, add to a mother's stress. The \"baby blues\" often peak around the fourth day. Then they ease up in less than 2 weeks. If your moodiness or anxiety lasts for more than 2 weeks, or if you feel like life is not worth living, you may have postpartum depression. This is different for each mother. Some mothers with serious depression may worry intensely about their infant's well-being.  Others may feel distant from their child. Some mothers might even feel that they might harm their baby. A mother may have signs of paranoia, wondering if someone is watching her. Depression is not a sign of weakness. It is a medical condition that requires treatment. Medicine and counseling often work well to reduce depression. Talk to your doctor about taking antidepressant medicine while breastfeeding. Follow-up care is a key part of your treatment and safety. Be sure to make and go to all appointments, and call your doctor if you are having problems. It's also a good idea to know your test results and keep a list of the medicines you take. How do you know if you are depressed? With all the changes in your life, you may not know if you are depressed. Pregnancy sometimes causes changes in how you feel that are similar to the symptoms of depression. Symptoms of depression include: · Feeling sad or hopeless and losing interest in daily activities. These are the most common symptoms of depression. · Sleeping too much or not enough. · Feeling tired. You may feel as if you have no energy. · Eating too much or too little. · Writing or talking about death, such as writing suicide notes or talking about guns, knives, or pills. Keep the numbers for these national suicide hotlines: 2-250-467-TALK (2-295.699.9934) and 5-308-BMFVVOX (0-376.355.2695). If you or someone you know talks about suicide or feeling hopeless, get help right away. How can you care for yourself at home? · Be safe with medicines. Take your medicines exactly as prescribed. Call your doctor if you think you are having a problem with your medicine. · Eat a healthy diet so that you can keep up your energy. · Get regular daily exercise, such as walks, to help improve your mood. · Get as much sunlight as possible. Keep your shades and curtains open. Get outside as much as you can. · Avoid using alcohol or other substances to feel better. · Get as much rest and sleep as possible. Avoid doing too much. Being too tired can increase depression. · Play stimulating music throughout your day and soothing music at night. · Schedule outings and visits with friends and family. Ask them to call you regularly, so that you do not feel alone. · Ask for help with preparing food and other daily tasks. Family and friends are often happy to help a mother with a . · Be honest with yourself and those who care about you. Tell them about your struggle. · Join a support group of new mothers. No one can better understand the challenges of caring for a  than other new mothers. · If you feel like life is not worth living or are feeling hopeless, get help right away. Keep the numbers for these national suicide hotlines: 7-646-410-TALK (1-210.837.1010) and 4-248-WVNAALQ (2-394.283.6185). When should you call for help? Call 911 anytime you think you may need emergency care. For example, call if: 
· You feel you cannot stop from hurting yourself, your baby, or someone else. Call your doctor now or seek immediate medical care if: 
· You are having trouble caring for yourself or your baby. · You hear voices. Watch closely for changes in your health, and be sure to contact your doctor if: 
· You have problems with your depression medicine. · You do not get better as expected. Where can you learn more? Go to http://federica-theo.info/. Enter B332 in the search box to learn more about \"Depression After Childbirth: Care Instructions. \" Current as of: 2016 Content Version: 11.3 © 8293-6038 Written. Care instructions adapted under license by [employer] (which disclaims liability or warranty for this information).  If you have questions about a medical condition or this instruction, always ask your healthcare professional. [place] [address] any warranty or liability for your use of this information.

## 2017-10-19 NOTE — PROGRESS NOTES
5151 Anesthesia at bedside for epidural and consent    0445 Time out    0446 Procedure start    0453 Catheter placed    0455 Test dose    0500 Loading dose

## 2017-10-19 NOTE — ANESTHESIA POSTPROCEDURE EVALUATION
Post-Anesthesia Evaluation & Assessment    Visit Vitals    /84    Pulse 88    Temp 36.7 °C (98.1 °F)    Ht 5' 4\" (1.626 m)    Wt 99.8 kg (220 lb)    Breastfeeding Yes    BMI 37.76 kg/m2       Nausea/Vomiting: no nausea    Pain score (VAS): 0    Post-operative hydration adequate. Mental status & Level of consciousness: orientation per pre-anesthetic level    Neurological status: moves all extremities, sensation grossly intact    Pulmonary status: airway patent, no supplemental oxygen required    Complications related to anesthesia: none    Additional comments: Patient lying in bed, has not tried to ambulate yet, reports she is pleased with how epidural worked for labor.  Baby at bedside receiving exam by ARIN Olivia CRNA  October 19, 2017

## 2017-10-19 NOTE — PROGRESS NOTES
0718:Nursery arrived in room for delivery; Dr. Nano Coombs already at bedside. 1544:  of VMI with spontaneous respirations. Baby to maternal abdomen. Baby being tended to by nursery nurse. 0725: Cord clamped and cut.    0730: Spontaneous delivery of placenta. 0730: Perineum inspected by MD. Perineum intact. 2429: Lamar-care done, Ice pack applied, Baby remains skin to skin for magic hour, side rails up and call light in reach, FOB at bedside. Patient instructed not to get for the first time without nurse at bedside and to call with increased bleeding. Breastfeeding education done. 0745: Called dietary for tray. 0830: Patient states legs are still \"dead;\" will continue to monitor.

## 2017-10-19 NOTE — PROCEDURES
Delivery Note    Obstetrician:  Arthur Dooley DO    Assistant: none    Pre-Delivery Diagnosis: Term pregnancy, Spontaneous labor, Single fetus or Uncomplicated pregnancy    Post-Delivery Diagnosis: Living  infant(s) or Male    Intrapartum Event: None    Procedure: Spontaneous vaginal delivery    Epidural: YES    Monitor:  Fetal Heart Tones - External and Uterine Contractions - External    Indications for instrumental delivery: none    Estimated Blood Loss: 300 ml    Episiotomy: none    Laceration(s):  none    Laceration(s) repair: NO    Presentation: Cephalic    Fetal Description: tran    Fetal Position: Right Occiput Anterior    Birth Weight:     Birth Length:     Apgar - One Minute: 8    Apgar - Five Minutes: 9    Umbilical Cord: 3 vessels present and Cord blood sent to lab for type, Rh, and Yuri' test    Specimens: none           Complications:  none           Cord Blood Results:   Information for the patient's :  Durward Dario [[id number]]   No results found for: PCTABR, ABORH, PCTDIG, BILI, ABORH, ABORHEXT    Prenatal Labs:     Lab Results   Component Value Date/Time    ABO/Rh(D) O POSITIVE 10/19/2017 03:15 AM    ABO,Rh O POSITIVE  2015    HBsAg, External Negative 2017    HIV, External NON REACTIVE  2015    Rubella, External Immune 2017    RPR, External NEIN REACTIVE 2015    Gonorrhea, External NEGATIVE  2015    Chlamydia, External NEGATIVE  2015    GrBStrep, External Negative 10/11/2017        Attending Attestation: I performed the procedure    Signed By:  Arthur Dooley DO     2017

## 2017-10-19 NOTE — H&P
History & Physical    Name: Josefina Pratt MRN: [id number]  SSN: [id number]    YOB: 1985  Age: 34 y.o. Sex: female      Subjective: Patient complains of a gush of fluid and contractions. Estimated Date of Delivery: 10/30/17  OB History      Para Term  AB Living    3 2 2   1    SAB TAB Ectopic Molar Multiple Live Births        0 1          MsJoanna Aly is admitted with pregnancy at 38w3d for active labor and SROM. Prenatal course was normal. Please see prenatal records for details. Past Medical History:   Diagnosis Date    Depression affecting pregnancy in second trimester, antepartum 2017    Nausea and vomiting during pregnancy 2015    Nausea and vomiting in pregnancy prior to 22 weeks gestation 3/28/2017     History reviewed. No pertinent surgical history. Social History     Occupational History    Not on file. Social History Main Topics    Smoking status: Never Smoker    Smokeless tobacco: Never Used    Alcohol use No    Drug use: No    Sexual activity: Yes     Partners: Male     Family History   Problem Relation Age of Onset    High Cholesterol Mother     Cancer Maternal Grandmother        No Known Allergies  Prior to Admission medications    Medication Sig Start Date End Date Taking? Authorizing Provider   raNITIdine (ZANTAC) 75 mg tablet Take 2 Tabs by mouth two (2) times a day. Indications: HEARTBURN 10/4/17  Yes An Ferrer MD   PNV NO.122/IRON/FOLIC ACID (PRENATAL MULTI PO) Take  by mouth. Yes Soy Bundy MD   sertraline (ZOLOFT) 50 mg tablet Take 1 Tab by mouth daily. Indications: ANXIETY WITH DEPRESSION 17   An Ferrer MD   ondansetron [facility] ODT) 4 mg disintegrating tablet Take 1 Tab by mouth every eight (8) hours as needed for Nausea.  Indications: EXCESSIVE VOMITING IN PREGNANCY 3/28/17   An Ferrer MD        Review of Systems: A comprehensive review of systems was negative except for that written in the HPI.    Objective:     Vitals:  Vitals:    10/19/17 0451 10/19/17 0455 10/19/17 0458 10/19/17 0501   BP: 147/87 135/85 139/85 156/80   Pulse: 100 98 (!) 105 (!) 120   Weight:       Height:            Physical Exam:  Patient without distress. Fundus: soft and non tender  Perineum: blood absent, amniotic fluid present  Cervical Exam: 5 cm dilated    80% effaced    -2 station    Presenting Part: cephalic  Membranes:  Spontaneous Rupture of Membranes; Amniotic Fluid: clear fluid  Fetal Heart Rate: Reactive    Prenatal Labs:   Lab Results   Component Value Date/Time    Rubella, External Immune 03/28/2017    GrBStrep, External Negative 10/11/2017    HBsAg, External Negative 03/28/2017    HIV, External NON REACTIVE  11/23/2015    RPR, External NEIN REACTIVE 11/23/2015    Gonorrhea, External NEGATIVE  11/23/2015    Chlamydia, External NEGATIVE  11/23/2015         Assessment/Plan:     Plan: Admit for Reassuring fetal status, Labor  Progressing normally, Continue plan for vaginal delivery. Group B Strep was negative.     Signed By:  Uday Pruett DO     October 19, 2017

## 2017-10-19 NOTE — ROUTINE PROCESS
Bedside and Verbal shift change report given to CAROLE Cordova (oncoming nurse) by Cathryn Rodriguez RN (offgoing nurse). Report included the following information SBAR, Intake/Output and Recent Results.

## 2017-10-19 NOTE — ANESTHESIA PROCEDURE NOTES
Epidural Block    Start time: 10/19/2017 4:37 AM  End time: 10/19/2017 5:02 AM  Performed by: Eveline Chavis  Authorized by: Eveline Chavis     Pre-Procedure  Indication: at surgeon's request, primary anesthetic and labor epidural    Preanesthetic Checklist: patient identified, risks and benefits discussed, anesthesia consent, site marked, patient being monitored, timeout performed and anesthesia consent    Timeout Time: 04:45        Epidural:   Patient position:  Seated  Prep region:  Lumbar  Prep: Betadine and Patient draped    Location:  L3-4    Needle and Epidural Catheter:   Needle Type:  Tuohy  Needle Gauge:  18 G  Injection Technique:  Loss of resistance using saline  Attempts:  1  Catheter Size:  20 G  Catheter at Skin Depth (cm):  10  Depth in Epidural Space (cm):  3  Events: no blood with aspiration, no cerebrospinal fluid with aspiration and no paresthesia    Test Dose:  Negative and lidocaine 1.5% w/ epi    Assessment:   Catheter Secured:  Tegaderm and tape  Insertion:  Uncomplicated  Patient tolerance:  Patient tolerated the procedure well with no immediate complications  For Vitals see nursing notes.     Cheryl Wiggins CRNA  5:10 AM

## 2017-10-20 LAB
HCT VFR BLD AUTO: 35 % (ref 35–45)
HGB BLD-MCNC: 11.6 G/DL (ref 12–16)

## 2017-10-20 PROCEDURE — 36415 COLL VENOUS BLD VENIPUNCTURE: CPT | Performed by: OBSTETRICS & GYNECOLOGY

## 2017-10-20 PROCEDURE — 85014 HEMATOCRIT: CPT | Performed by: OBSTETRICS & GYNECOLOGY

## 2017-10-20 PROCEDURE — 65270000029 HC RM PRIVATE

## 2017-10-20 PROCEDURE — 74011250637 HC RX REV CODE- 250/637: Performed by: OBSTETRICS & GYNECOLOGY

## 2017-10-20 PROCEDURE — 85018 HEMOGLOBIN: CPT | Performed by: OBSTETRICS & GYNECOLOGY

## 2017-10-20 RX ADMIN — IBUPROFEN 800 MG: 400 TABLET, FILM COATED ORAL at 21:23

## 2017-10-20 RX ADMIN — IBUPROFEN 800 MG: 400 TABLET, FILM COATED ORAL at 13:00

## 2017-10-20 RX ADMIN — IBUPROFEN 800 MG: 400 TABLET, FILM COATED ORAL at 04:40

## 2017-10-20 RX ADMIN — MULTIPLE VITAMINS W/ MINERALS TAB 1 TABLET: TAB at 09:18

## 2017-10-20 NOTE — PROGRESS NOTES
2045 Mother attempting to nurse at this time. Reviewed infant feeding with patient. Patient able to verbalize understanding. Vital signs obtained. Medication administered. Patient denies any further needs at this time. 2135 Patient denies any needs at this time. 0015 Vital signs obtained at this time. Patient denies any further needs. 0145 Patient denies any needs at this time. 8436 Patient arouses easily; infant to nursery for routine  procedure, patient denies any further needs at this time. 0430 Vital signs obtained. Medication administered per MAR. Patient denies any further needs at this time.

## 2017-10-20 NOTE — PROGRESS NOTES
Progress Note                               Patient: Vladimir Baltazar MRN: [id number]  SSN: [id number]    YOB: 1985  Age: 34 y.o. Sex: female      Postpartum Day Number 1    Subjective:     Patient doing well postpartum without significant complaints. Voiding without difficulty. Patient's bleeding is Patient reports normal lochia. .  Denies chest pain, shortness of breath, abdominal pain, calf pain, fevers. Mild cramping, controlled with pain medications. Baby doing well-- breast/bottle feeding. Objective:     Patient Vitals for the past 12 hrs:   Temp Pulse Resp BP   10/20/17 0430 98.1 °F (36.7 °C) 68 16 124/76   10/20/17 0015 98.5 °F (36.9 °C) 83 18 129/76        Temp (24hrs), Av.4 °F (36.9 °C), Min:98.1 °F (36.7 °C), Max:98.6 °F (37 °C)      Physical Exam:    General:   Patient without distress. Abdomen: Soft, fundus firm at level of umbilicus, nontender   Lower Extremities: Negative for swelling, cords, or tenderness. Lab/Data Review:  CBC:    Recent Labs      10/20/17   0607  10/19/17   0315   WBC   --   5.8   HGB  11.6*  12.9   HCT  35.0  38.1   PLT   --   185       Assessment and Plan:   34y.o. year old  s/p normal spontaneous vaginal delivery at 38w3d  Postpartum Day 1  Patient appears to be having an uncomplicated postpartum course. Continue routine perineal care and maternal education. Breast/bottle feeding going well.     Circ held per peds request.      Signed By: Karl Roche DO     2017

## 2017-10-20 NOTE — ROUTINE PROCESS
Verbal shift change report given to Robyn Redmond RN (oncoming nurse) by John Gardiner RN (offgoing nurse). Report included the following information SBAR.

## 2017-10-20 NOTE — ROUTINE PROCESS
Verbal shift change report given to Angely Stevens RN (oncoming nurse) by Gurwinder Miller. Marsha Rose RN (offgoing nurse). Report included the following information Kardex, Intake/Output, MAR and Recent Results. 0915--assessment done--shower encouraged--reports effective pain management with Motrin--is both breast and bottle feeding.

## 2017-10-20 NOTE — PROGRESS NOTES
Patient resting at this time. Voiding without difficulty. Ambulating independently. Pain managed with medication. Encouraged patient to request staff assistance as needed. Patient denies any further needs at this time.

## 2017-10-20 NOTE — PROGRESS NOTES
Problem: Falls - Risk of  Goal: *Absence of Falls  Document Coco Fall Risk and appropriate interventions in the flowsheet.   Outcome: Progressing Towards Goal  Fall Risk Interventions:            Medication Interventions: Teach patient to arise slowly    Elimination Interventions: Call light in reach

## 2017-10-21 VITALS
OXYGEN SATURATION: 100 % | WEIGHT: 220 LBS | HEIGHT: 64 IN | HEART RATE: 77 BPM | BODY MASS INDEX: 37.56 KG/M2 | SYSTOLIC BLOOD PRESSURE: 116 MMHG | DIASTOLIC BLOOD PRESSURE: 73 MMHG | TEMPERATURE: 98.4 F | RESPIRATION RATE: 17 BRPM

## 2017-10-21 PROCEDURE — 76060000078 HC EPIDURAL ANESTHESIA

## 2017-10-21 PROCEDURE — 90715 TDAP VACCINE 7 YRS/> IM: CPT | Performed by: OBSTETRICS & GYNECOLOGY

## 2017-10-21 PROCEDURE — 90686 IIV4 VACC NO PRSV 0.5 ML IM: CPT | Performed by: OBSTETRICS & GYNECOLOGY

## 2017-10-21 PROCEDURE — 74011250636 HC RX REV CODE- 250/636: Performed by: OBSTETRICS & GYNECOLOGY

## 2017-10-21 PROCEDURE — 75410000000 HC DELIVERY VAGINAL/SINGLE

## 2017-10-21 PROCEDURE — 74011250637 HC RX REV CODE- 250/637: Performed by: OBSTETRICS & GYNECOLOGY

## 2017-10-21 PROCEDURE — 75410000002 HC LABOR FEE PER 1 HR

## 2017-10-21 PROCEDURE — 90471 IMMUNIZATION ADMIN: CPT

## 2017-10-21 RX ORDER — IBUPROFEN 600 MG/1
600 TABLET ORAL
Qty: 30 TAB | Refills: 0 | Status: SHIPPED | OUTPATIENT
Start: 2017-10-21 | End: 2021-09-22 | Stop reason: ALTCHOICE

## 2017-10-21 RX ADMIN — MULTIPLE VITAMINS W/ MINERALS TAB 1 TABLET: TAB at 10:03

## 2017-10-21 RX ADMIN — TETANUS TOXOID, REDUCED DIPHTHERIA TOXOID AND ACELLULAR PERTUSSIS VACCINE, ADSORBED 0.5 ML: 5; 2.5; 8; 8; 2.5 SUSPENSION INTRAMUSCULAR at 11:57

## 2017-10-21 RX ADMIN — INFLUENZA VIRUS VACCINE 0.5 ML: 15; 15; 15; 15 SUSPENSION INTRAMUSCULAR at 11:56

## 2017-10-21 RX ADMIN — IBUPROFEN 800 MG: 400 TABLET, FILM COATED ORAL at 10:03

## 2017-10-21 NOTE — ROUTINE PROCESS
Bedside and Verbal shift change report given to Yuli Garnica RN (oncoming nurse) by Meño Jimenez RN (offgoing nurse). Report included the following information SBAR, Kardex, Intake/Output, MAR and Recent Results.

## 2017-10-21 NOTE — PROGRESS NOTES
Progress Note                               Patient: Elkin Vazquez MRN: [id number]  SSN: [id number]    YOB: 1985  Age: 34 y.o. Sex: female      Postpartum Day Number 2    Subjective:     Patient doing well postpartum without significant complaints. Voiding without difficulty. Patient's bleeding is Patient reports normal lochia. .  Denies chest pain, shortness of breath, abdominal pain, calf pain, fevers. Mild cramping, controlled with pain medications. Baby doing well-- bottle/breast feeding. Objective:     Patient Vitals for the past 12 hrs:   Temp Pulse Resp BP SpO2   10/21/17 0050 98.3 °F (36.8 °C) 75 24 119/60 99 %        Temp (24hrs), Av.2 °F (36.8 °C), Min:98 °F (36.7 °C), Max:98.3 °F (36.8 °C)      Physical Exam:    General:   Patient without distress. Abdomen: Soft, fundus firm at level of umbilicus, nontender   Lower Extremities: Negative for swelling, cords, or tenderness. Assessment and Plan:   34y.o. year old  s/p normal spontaneous vaginal delivery at 38w3d  Postpartum Day 2  Patient appears to be having an uncomplicated postpartum course. Continue routine perineal care and maternal education. , Will discharge home today. Feeding going well.         Signed By: Jin Villanueva DO     2017

## 2017-10-21 NOTE — DISCHARGE SUMMARY
Patient ID:  Lawanda Bond  [id number]  86 y.o.  1985    Admit Date: 10/19/2017    Discharge Date: 10/21/2017     Admitting Physician: Anusha Yip DO     Admission Diagnoses: maternity;Labor and delivery, indication for care    Discharge Diagnoses: same as above      Additional Diagnoses:Present on Admission:  **None**       Historical Additional  Problem List.:   Problem List as of 10/21/2017  Date Reviewed: 10/19/2017          Codes Class Noted - Resolved    Labor and delivery, indication for care ICD-10-CM: O75.9  ICD-9-CM: 659.90  10/19/2017 - Present        Depression affecting pregnancy in second trimester, antepartum ICD-10-CM: O99.342, F32.9  ICD-9-CM: 648.43, 311  2017 - Present        Nausea and vomiting in pregnancy prior to 22 weeks gestation ICD-10-CM: O21.9  ICD-9-CM: 643.00  3/28/2017 - Present        Short interval between pregnancies affecting pregnancy in second trimester, antepartum ICD-10-CM: O09.892  ICD-9-CM: V23.89  2016 - Present        Flank pain, acute ICD-10-CM: R10.9  ICD-9-CM: 789.09, 338.19  2016 - Present        Non-intractable vomiting with nausea ICD-10-CM: R11.2  ICD-9-CM: 787.01  2016 - Present               Baby link  Information for the patient's :  [place] [facility] [[id number]]     Delivery Type: Vaginal, Spontaneous Delivery   Delivery Date: 10/19/2017   Delivery Time: 7:23 AM     Birth Weight: 7 lb 13.8 oz (3.565 kg)     Sex:  male  Delivery Clinician:  Conchita Khan   Gestational Age: Gestational Age: 23w6d    Presentation: Vertex   Position: Right  Occiput  Anterior     Apgars were 8  and 9      Resuscitation Method: Suctioning-bulb; Tactile Stimulation     Meconium Stained: None  Living Status: Living       Placenta Date/Time: 10/19  7:30 AM   Placenta Removal: Spontaneous   Placenta Appearance: Vertex [1]     Cord Information: 3 Vessels    Cord Events: None       Cord Blood Sent?:  Yes    Blood Gases Sent?:  No         Baby procedures:   Information for the patient's :  Dudley Thomas [[id number]]          Feeding Method: Infant Feeding: Breastmilk, Formula    Immunizations:    Immunization History   Administered Date(s) Administered    Tdap 2016       Group Beta Strep:   GrBStrep, External   Date Value Ref Range Status   10/11/2017 Negative  Final        WBC   Date/Time Value Ref Range Status   10/19/2017 03:15 AM 5.8 4.6 - 13.2 K/uL Final   2017 10:28 AM 5.8 4.0 - 11.0 K/uL Final   2016 02:40 AM 6.4 4.6 - 13.2 K/uL Final     HGB   Date/Time Value Ref Range Status   10/20/2017 06:07 AM 11.6 (L) 12.0 - 16.0 g/dL Final   10/19/2017 03:15 AM 12.9 12.0 - 16.0 g/dL Final   2017 10:28 AM 13.3 11.7 - 15.5 g/dL Final     PLATELET   Date/Time Value Ref Range Status   10/19/2017 03:15  135 - 420 K/uL Final     Hgb, External   Date/Time Value Ref Range Status   2015 13.1  Final     Platelet cnt., External   Date/Time Value Ref Range Status   2015 313  Final       Hospital Course: Unremarkable    Patient is feeling well. Good pain control. Ambulating, voiding and eating well, no nausea or vomiting. On examination. Patient is in good general condition in no apparent distress    No data found. Temp (24hrs), Av.2 °F (36.8 °C), Min:98 °F (36.7 °C), Max:98.3 °F (36.8 °C)            Prenatal Labs:   Lab Results   Component Value Date/Time    Rubella, External Immune 2017    GrBStrep, External Negative 10/11/2017    HBsAg, External Negative 2017    HIV, External NON REACTIVE  2015    RPR, External NEIN REACTIVE 2015    Gonorrhea, External NEGATIVE  2015    Chlamydia, External NEGATIVE  2015         Impression: OK for her to be discharged. Plan:   Discharge today with instructions for activity, medications, and what to call for.       Follow-up Appointments   Procedures    FOLLOW UP VISIT Appointment in: 6 Weeks     Standing Status:   Standing Number of Occurrences:   1     Order Specific Question:   Appointment in     Answer:   6 Weeks         Signed By: Constantino Humphries DO     October 21, 2017

## 2017-10-21 NOTE — ROUTINE PROCESS
Bedside shift change report given to Robin Portillo RN (oncoming nurse) by Margarito Morrow RN (offgoing nurse). Report included the following information SBAR, Kardex, Procedure Summary, Intake/Output, MAR and Recent Results.

## 2017-10-21 NOTE — DISCHARGE INSTRUCTIONS

## 2017-12-08 ENCOUNTER — ROUTINE PRENATAL (OUTPATIENT)
Dept: OBGYN CLINIC | Age: 32
End: 2017-12-08

## 2017-12-08 VITALS
HEIGHT: 64 IN | HEART RATE: 88 BPM | DIASTOLIC BLOOD PRESSURE: 91 MMHG | RESPIRATION RATE: 18 BRPM | BODY MASS INDEX: 32.44 KG/M2 | WEIGHT: 190 LBS | SYSTOLIC BLOOD PRESSURE: 147 MMHG

## 2017-12-08 NOTE — PROGRESS NOTES
Subjective:   Ms. Dariela Gallardo is a 34 y.o. who is now 6 weeks postpartum. OB History      Para Term  AB Living    3 3 3 0  3    SAB TAB Ectopic Molar Multiple Live Births        0 3        Method of delivery: normal spontaneous vaginal delivery  She is not breast-feeding and is not experiencing problems. Pregnancy complications: none. She is feeling well and happy. She currently uses abstinence for contraception. She plans to use IUD for contraception. Current Outpatient Prescriptions   Medication Sig Dispense Refill    ibuprofen (MOTRIN) 600 mg tablet Take 1 Tab by mouth every six (6) hours as needed. 30 Tab 0    raNITIdine (ZANTAC) 75 mg tablet Take 2 Tabs by mouth two (2) times a day. Indications: HEARTBURN 60 Tab 2    PNV NO.122/IRON/FOLIC ACID (PRENATAL MULTI PO) Take  by mouth. No Known Allergies  Past Medical History:   Diagnosis Date    Depression affecting pregnancy in second trimester, antepartum 2017    Nausea and vomiting during pregnancy 2015    Nausea and vomiting in pregnancy prior to 22 weeks gestation 3/28/2017     History reviewed. No pertinent surgical history.   Family History   Problem Relation Age of Onset    High Cholesterol Mother     Cancer Maternal Grandmother      Social History   Substance Use Topics    Smoking status: Never Smoker    Smokeless tobacco: Never Used    Alcohol use No        Objective:   Physical Exam:  Date of last Pap smear: 3/28/17  Physical Exam: GENERAL APPEARANCE: alert, well appearing, in no apparent distress  NEUROLOGIC: alert, oriented, normal speech, no focal findings or movement disorder noted  EXTERNAL GENITALIA POSTPARTUM: normal, well-healed, without lesions or masses  VAGINA POSTPARTUM: normal, well-healed, physiologic discharge, without lesions  CERVIX POSTPARTUM: normal, well-healed, without lesions  UTERUS POSTPARTUM: normal size, well involuted, firm, non-tender  ADNEXA POSTPARTUM: no masses palpable and nontender    Assessment/Plan:   normal postpartum exam  patient is a candidate for IUD for contraception, with no contraindications  able to resume normal activities  Follow up for IUD placement. Plan of care discussed. Patient expressed understanding.

## 2018-03-02 ENCOUNTER — DOCUMENTATION ONLY (OUTPATIENT)
Dept: OBGYN CLINIC | Age: 33
End: 2018-03-02

## 2018-04-17 ENCOUNTER — OFFICE VISIT (OUTPATIENT)
Dept: OBGYN CLINIC | Age: 33
End: 2018-04-17

## 2018-04-17 VITALS
BODY MASS INDEX: 33.12 KG/M2 | OXYGEN SATURATION: 99 % | SYSTOLIC BLOOD PRESSURE: 125 MMHG | HEART RATE: 90 BPM | WEIGHT: 194 LBS | DIASTOLIC BLOOD PRESSURE: 80 MMHG | HEIGHT: 64 IN | RESPIRATION RATE: 18 BRPM

## 2018-04-17 DIAGNOSIS — Z30.430 ENCOUNTER FOR IUD INSERTION: Primary | ICD-10-CM

## 2018-04-17 LAB
HCG URINE, QL. (POC): NEGATIVE
VALID INTERNAL CONTROL?: YES

## 2018-04-17 NOTE — PROCEDURES
[facility] OB/GYN  OFFICE PROCEDURE PROGRESS NOTE        Chart reviewed for the following:   Yair BARBER DO, have reviewed the History, Physical and updated the Allergic reactions for Crystal L [address] performed immediately prior to start of procedure:   Yair BARBER DO, have performed the following reviews on [facility] prior to the start of the procedure:            * Patient was identified by name and date of birth   * Agreement on procedure being performed was verified  * Risks and Benefits explained to the patient  * Procedure site verified and marked as necessary  * Patient was positioned for comfort  * Consent was signed and verified     Time: 9447    Date of procedure: 4/17/2018    Procedure performed by:  Yair Adair DO    Provider assisted by: Nunu Kelley LPN    Patient assisted by: self    How tolerated by patient: tolerated the procedure well with no complications    Post Procedural Pain Scale: 2 - Hurts Little Bit    Comments: none    Procedure note, IUD insertion:  Urine pregnancy test was done and was negative. The patient was placed in a lithotomy position and a speculum inserted in the vagina. The cervix was swabbed x 3 with Betadine. A single toothed tenaculum was placed on the anterior lip of the cervix and the uterus sounded to  9.5 cm. The device was inserted and deployed without difficulty. The strings were trimmed and all instruments removed from the vagina. There were no complications and the patient tolerated the procedure well.

## 2018-05-16 ENCOUNTER — OFFICE VISIT (OUTPATIENT)
Dept: OBGYN CLINIC | Age: 33
End: 2018-05-16

## 2018-05-16 VITALS — HEIGHT: 64 IN | RESPIRATION RATE: 18 BRPM | OXYGEN SATURATION: 100 %

## 2018-06-12 ENCOUNTER — OFFICE VISIT (OUTPATIENT)
Dept: OBGYN CLINIC | Age: 33
End: 2018-06-12

## 2018-06-12 VITALS
HEIGHT: 64 IN | OXYGEN SATURATION: 100 % | HEART RATE: 75 BPM | WEIGHT: 194 LBS | BODY MASS INDEX: 33.12 KG/M2 | DIASTOLIC BLOOD PRESSURE: 82 MMHG | SYSTOLIC BLOOD PRESSURE: 125 MMHG | RESPIRATION RATE: 18 BRPM

## 2018-06-12 DIAGNOSIS — B37.2 YEAST DERMATITIS: ICD-10-CM

## 2018-06-12 DIAGNOSIS — Z30.431 ENCOUNTER FOR ROUTINE CHECKING OF INTRAUTERINE CONTRACEPTIVE DEVICE (IUD): Primary | ICD-10-CM

## 2018-06-12 RX ORDER — FLUCONAZOLE 150 MG/1
150 TABLET ORAL DAILY
Qty: 1 TAB | Refills: 0 | Status: SHIPPED | OUTPATIENT
Start: 2018-06-12 | End: 2018-06-13

## 2018-06-13 NOTE — PROGRESS NOTES
Subjective:      Patient presents for routine check of her Mirena IUD which was placed one month ago. She denies any problems with the device. She also complains of redness and itching below her breasts bilaterally which she thinks began when the weather began to warm up. Current Outpatient Prescriptions   Medication Sig Dispense Refill    fluconazole (DIFLUCAN) 150 mg tablet Take 1 Tab by mouth daily for 1 day. FDA advises cautious prescribing of oral fluconazole in pregnancy. 1 Tab 0    ibuprofen (MOTRIN) 600 mg tablet Take 1 Tab by mouth every six (6) hours as needed. 30 Tab 0    raNITIdine (ZANTAC) 75 mg tablet Take 2 Tabs by mouth two (2) times a day. Indications: HEARTBURN 60 Tab 2    PNV NO.122/IRON/FOLIC ACID (PRENATAL MULTI PO) Take  by mouth. No Known Allergies  Past Medical History:   Diagnosis Date    Depression affecting pregnancy in second trimester, antepartum 6/20/2017    Nausea and vomiting during pregnancy 11/19/2015    Nausea and vomiting in pregnancy prior to 22 weeks gestation 3/28/2017     History reviewed. No pertinent surgical history. Family History   Problem Relation Age of Onset    High Cholesterol Mother     Cancer Maternal Grandmother      Social History   Substance Use Topics    Smoking status: Never Smoker    Smokeless tobacco: Never Used    Alcohol use No      Review of Systems    A comprehensive review of systems was negative except for that written in the HPI.        Objective:     Visit Vitals    /82    Pulse 75    Resp 18    Ht 5' 4\" (1.626 m)    Wt 194 lb (88 kg)    LMP 06/05/2018    SpO2 100%    Breastfeeding Yes    BMI 33.3 kg/m2     General appearance: alert, cooperative, no distress, appears stated age  Breasts: normal appearance, no masses or tenderness, well-circumscribed patches of erythema inferior to breasts bilaterally  Pelvic: External genitalia normal, Vagina normal without discharge, cervix normal in appearance with IUD strings visible, no CMT, uterus normal size, shape, and consistency, no adnexal masses or tenderness, rectovaginal septum normal       Assessment/Plan:     IUD check, normal placement  Yeast dermatitis, Rx Diflucan. Patient also advised to use OTC athlete's foot powder. Follow up prn. Plan of care discussed. Patient expressed understanding.

## 2019-04-12 ENCOUNTER — OFFICE VISIT (OUTPATIENT)
Dept: OBGYN CLINIC | Age: 34
End: 2019-04-12

## 2019-04-12 VITALS
HEIGHT: 64 IN | BODY MASS INDEX: 33.97 KG/M2 | HEART RATE: 88 BPM | WEIGHT: 199 LBS | DIASTOLIC BLOOD PRESSURE: 93 MMHG | SYSTOLIC BLOOD PRESSURE: 136 MMHG

## 2019-04-12 DIAGNOSIS — Z30.432 ENCOUNTER FOR IUD REMOVAL: Primary | ICD-10-CM

## 2019-04-12 DIAGNOSIS — Z30.013 ENCOUNTER FOR INITIAL PRESCRIPTION OF INJECTABLE CONTRACEPTIVE: ICD-10-CM

## 2019-04-12 LAB
HCG URINE, QL. (POC): NEGATIVE
VALID INTERNAL CONTROL?: YES

## 2019-04-12 RX ORDER — MEDROXYPROGESTERONE ACETATE 150 MG/ML
150 INJECTION, SUSPENSION INTRAMUSCULAR ONCE
Qty: 1 ML | Refills: 3 | Status: SHIPPED | OUTPATIENT
Start: 2019-04-12 | End: 2019-04-12

## 2019-04-12 NOTE — PROCEDURES
[facility] OB/GYN  OFFICE PROCEDURE PROGRESS NOTE        Chart reviewed for the following:   Danyelle BARBER DO, have reviewed the History, Physical and updated the Allergic reactions for Crystal L [facility] performed immediately prior to start of procedure:   Danyelle BARBER DO, have performed the following reviews on [facility] prior to the start of the procedure:            * Patient was identified by name and date of birth   * Agreement on procedure being performed was verified  * Risks and Benefits explained to the patient  * Procedure site verified and marked as necessary  * Patient was positioned for comfort  * Consent was signed and verified     Time: 5653    Date of procedure: 4/12/2019    Procedure performed by:  Danyelle Garcia DO    Provider assisted by: Renaldo Culver LPN    Patient assisted by: self    How tolerated by patient: tolerated the procedure well with no complications    Post Procedural Pain Scale: 0 - No Hurt    Comments: none    Procedure note, IUD removal:  Written consent was obtained. The patient was placed in lithotomy position and a speculum inserted in the vagina. The IUD strings were easily visualized. The strings were grasped with Boseman forceps and the device withdrawn without difficulty. There were no complications and the patient tolerated the procedure well.

## 2019-04-12 NOTE — PROGRESS NOTES
Patient presents for Mirena IUD removal due to prolonged bleeding and increased depression. The IUD was placed 1 year ago. She also requests that she start Depo-Provera contraception. She has no other complaints. See procedure note.

## 2019-04-12 NOTE — PROGRESS NOTES
Patient present to office for IUD removal due to increased Depression and ongoing vaginal bleeding. Patient desire to began Depo Provera . Monica Reyna presents today for   Chief Complaint   Patient presents with    Removal Iud        Is someone accompanying this pt? no  atient does have any concerns at this time. Patient is requesting change in her  birth control at this time . Depression screening         Depression Screening:  No flowsheet data found. Learning Assessment:  Learning Assessment 11/19/2015   PRIMARY LEARNER Patient   HIGHEST LEVEL OF EDUCATION - PRIMARY LEARNER  GRADUATED HIGH SCHOOL OR GED   BARRIERS PRIMARY LEARNER NONE   PRIMARY LANGUAGE ENGLISH   LEARNER PREFERENCE PRIMARY LISTENING   ANSWERED BY PATIENT   RELATIONSHIP SELF       Abuse Screening:  No flowsheet data found. Fall Risk  No flowsheet data found. This Visit Test  Results for orders placed or performed in visit on 04/17/18   AMB POC URINE PREGNANCY TEST, VISUAL COLOR COMPARISON   Result Value Ref Range    VALID INTERNAL CONTROL POC Yes     HCG urine, Ql. (POC) Negative Negative       Health Maintenance reviewed and discussed and ordered per Provider. There are no preventive care reminders to display for this patient. .      Coordination of Care:  1. Have you been to the ER, urgent care clinic since your last visit? Hospitalized since your last visit? no    2. Have you seen or consulted any other health care providers outside of the [address] [place] [address] since your last visit? Include any pap smears or colon screening.  no

## 2019-07-03 ENCOUNTER — CLINICAL SUPPORT (OUTPATIENT)
Dept: OBGYN CLINIC | Age: 34
End: 2019-07-03

## 2019-07-03 VITALS
DIASTOLIC BLOOD PRESSURE: 74 MMHG | WEIGHT: 199 LBS | HEART RATE: 80 BPM | SYSTOLIC BLOOD PRESSURE: 120 MMHG | BODY MASS INDEX: 33.97 KG/M2 | HEIGHT: 64 IN

## 2019-07-03 DIAGNOSIS — Z30.42 FAMILY PLANNING, DEPO-PROVERA CONTRACEPTION MONITORING/ADMINISTRATION: Primary | ICD-10-CM

## 2019-07-03 LAB
HCG URINE, QL. (POC): NEGATIVE
VALID INTERNAL CONTROL?: YES

## 2019-07-03 NOTE — PROGRESS NOTES
After all patient's IDs confirmed with Rx, patient was given depo IM injection to left deltoid without adverse distress noted. UPT negative and she is aware of when her next injection is due in 10/2019.

## 2019-09-19 ENCOUNTER — CLINICAL SUPPORT (OUTPATIENT)
Dept: OBGYN CLINIC | Age: 34
End: 2019-09-19

## 2019-09-19 VITALS
BODY MASS INDEX: 34.15 KG/M2 | HEART RATE: 70 BPM | RESPIRATION RATE: 18 BRPM | DIASTOLIC BLOOD PRESSURE: 80 MMHG | SYSTOLIC BLOOD PRESSURE: 120 MMHG | WEIGHT: 200 LBS | HEIGHT: 64 IN

## 2019-09-19 DIAGNOSIS — Z30.42 FAMILY PLANNING, DEPO-PROVERA CONTRACEPTION MONITORING/ADMINISTRATION: Primary | ICD-10-CM

## 2019-09-19 LAB
HCG URINE, QL. (POC): NEGATIVE
VALID INTERNAL CONTROL?: YES

## 2019-09-19 NOTE — PROGRESS NOTES
Darcie Renee is a 27 y.o. female who presents for routine depo injection. After pt's IDs confirmed with medication, she was given depo IM injection to her left deltoid without any distress noted. She denies any symptoms , reactions or allergies that would exclude her from being immunized today. Risks and adverse reactions were discussed and the UPT negative. All questions were addressed. She was observed for 15 min post injection. There were no reactions observed. She is aware of her next injection is due in 12/2019.     Estefania Davalos LPN

## 2019-12-10 ENCOUNTER — CLINICAL SUPPORT (OUTPATIENT)
Dept: OBGYN CLINIC | Age: 34
End: 2019-12-10

## 2019-12-10 DIAGNOSIS — Z30.09 FAMILY PLANNING: Primary | ICD-10-CM

## 2019-12-10 LAB
HCG URINE, QL. (POC): NEGATIVE
VALID INTERNAL CONTROL?: YES

## 2019-12-10 NOTE — PROGRESS NOTES
p- test = negative      . Date last pap: 3-. Last Depo-Provera: 9-. Side Effects if any: none. Serum HCG indicated? no.  Depo-Provera 150 mg IM given by: matthew Carrera. Next appointment due 2-25- thru 3-.

## 2020-03-02 ENCOUNTER — TELEPHONE (OUTPATIENT)
Dept: OBGYN CLINIC | Age: 35
End: 2020-03-02

## 2020-03-20 ENCOUNTER — OFFICE VISIT (OUTPATIENT)
Dept: OBGYN CLINIC | Age: 35
End: 2020-03-20

## 2020-03-20 VITALS
DIASTOLIC BLOOD PRESSURE: 89 MMHG | WEIGHT: 205 LBS | SYSTOLIC BLOOD PRESSURE: 153 MMHG | BODY MASS INDEX: 35 KG/M2 | HEART RATE: 73 BPM | HEIGHT: 64 IN | RESPIRATION RATE: 18 BRPM | TEMPERATURE: 98.7 F

## 2020-03-20 DIAGNOSIS — E66.01 SEVERE OBESITY (HCC): ICD-10-CM

## 2020-03-20 DIAGNOSIS — Z30.09 FAMILY PLANNING: Primary | ICD-10-CM

## 2020-03-20 DIAGNOSIS — Z01.419 WELL WOMAN EXAM WITH ROUTINE GYNECOLOGICAL EXAM: ICD-10-CM

## 2020-03-20 PROBLEM — O21.9 NAUSEA AND VOMITING IN PREGNANCY PRIOR TO 22 WEEKS GESTATION: Status: RESOLVED | Noted: 2017-03-28 | Resolved: 2020-03-20

## 2020-03-20 PROBLEM — F32.A DEPRESSION AFFECTING PREGNANCY IN SECOND TRIMESTER, ANTEPARTUM: Status: RESOLVED | Noted: 2017-06-20 | Resolved: 2020-03-20

## 2020-03-20 PROBLEM — O99.342 DEPRESSION AFFECTING PREGNANCY IN SECOND TRIMESTER, ANTEPARTUM: Status: RESOLVED | Noted: 2017-06-20 | Resolved: 2020-03-20

## 2020-03-20 RX ORDER — ETONOGESTREL AND ETHINYL ESTRADIOL 11.7; 2.7 MG/1; MG/1
INSERT, EXTENDED RELEASE VAGINAL
Qty: 3 DEVICE | Refills: 4 | Status: SHIPPED | OUTPATIENT
Start: 2020-03-20 | End: 2021-08-17 | Stop reason: ALTCHOICE

## 2020-03-20 NOTE — PROGRESS NOTES
Subjective:   27 y.o. female for Well Woman Check. She has no concerns today. She was using Depo Provera, but wishes to start another contraceptive. Patient's last menstrual period was 02/28/2020 (approximate). Social History: single partner, contraception - none. Pertinent past medical hstory: no history of HTN, DVT, CAD, DM, liver disease, migraines or smoking. Patient Active Problem List   Diagnosis Code    Flank pain, acute R10.9    Severe obesity ([facility] [address] E66.01     Current Outpatient Medications   Medication Sig Dispense Refill    ethinyl estradiol-etonogestrel (NUVARING) 0.12-0.015 mg/24 hr vaginal ring Insert monthly as directed. Indications: birth control 3 Device 4    ibuprofen (MOTRIN) 600 mg tablet Take 1 Tab by mouth every six (6) hours as needed. 30 Tab 0    raNITIdine (ZANTAC) 75 mg tablet Take 2 Tabs by mouth two (2) times a day. Indications: HEARTBURN 60 Tab 2    PNV NO.122/IRON/FOLIC ACID (PRENATAL MULTI PO) Take  by mouth. No Known Allergies  Past Medical History:   Diagnosis Date    Depression affecting pregnancy in second trimester, antepartum 6/20/2017    Nausea and vomiting during pregnancy 11/19/2015    Nausea and vomiting in pregnancy prior to 22 weeks gestation 3/28/2017     History reviewed. No pertinent surgical history. Family History   Problem Relation Age of Onset    High Cholesterol Mother     Cancer Maternal Grandmother      Social History     Tobacco Use    Smoking status: Never Smoker    Smokeless tobacco: Never Used   Substance Use Topics    Alcohol use: No     Alcohol/week: 0.0 standard drinks        ROS:  Feeling well. No dyspnea or chest pain on exertion. No abdominal pain, change in bowel habits, black or bloody stools. No urinary tract symptoms. GYN ROS: normal menses, no abnormal bleeding, pelvic pain or discharge, no breast pain or new or enlarging lumps on self exam. No neurological complaints.     Objective:     Visit Vitals  /89   Pulse 73   Temp 98.7 °F (37.1 °C) (Oral)   Resp 18   Ht 5' 4\" (1.626 m)   Wt 205 lb (93 kg)   LMP 02/28/2020 (Approximate)   BMI 35.19 kg/m²     The patient appears well, alert, oriented x 3, in no distress. ENT normal.  Neck supple. No adenopathy or thyromegaly. SHASHA. Lungs are clear, good air entry, no wheezes, rhonchi or rales. S1 and S2 normal, no murmurs, regular rate and rhythm. Abdomen soft without tenderness, guarding, mass or organomegaly. Extremities show no edema, normal peripheral pulses. Neurological is normal, no focal findings. BREAST EXAM: breasts appear normal, no suspicious masses, no skin or nipple changes or axillary nodes    PELVIC EXAM: VULVA: normal appearing vulva with no masses, tenderness or lesions, VAGINA: normal appearing vagina with normal color and discharge, no lesions, CERVIX: normal appearing cervix without discharge or lesions, UTERUS: uterus is normal size, shape, consistency and nontender, ADNEXA: normal adnexa in size, nontender and no masses    Assessment/Plan:   well woman  no contraindication to begin hormonal therapy  mammogram  pap smear  counseled on breast self exam, STD prevention and family planning choices  return annually or prn    ICD-10-CM ICD-9-CM    1. Family planning Z30.09 V25.09 AMB POC URINE PREGNANCY TEST, VISUAL COLOR COMPARISON   2. Severe obesity ([facility] [address] E66.01 278.01    3. Well woman exam with routine gynecological exam Z01.419 V72.31 PAP IG, CT-NG TV HPV 16&18,45 ([id number], [id number])      PAP IG, CT-NG TV HPV 16&18,45 ([id number], [id number])   .

## 2020-03-24 LAB
HPV HIGH RISK, 507303: NEGATIVE
PAP IMAGE GUIDED, 8900296: NORMAL

## 2020-03-25 LAB
CHLAMYDIA TRACHOMATIS THINPREP, 13342: NEGATIVE
NEISSERIA GONORRHOEAE THINPREP, 13343: NEGATIVE
TRICHOMONAS NUC AMP-THIN PREP,13357: NEGATIVE

## 2021-08-17 ENCOUNTER — OFFICE VISIT (OUTPATIENT)
Dept: FAMILY MEDICINE CLINIC | Age: 36
End: 2021-08-17
Payer: COMMERCIAL

## 2021-08-17 VITALS
RESPIRATION RATE: 16 BRPM | DIASTOLIC BLOOD PRESSURE: 84 MMHG | HEART RATE: 59 BPM | OXYGEN SATURATION: 99 % | TEMPERATURE: 98.6 F | HEIGHT: 63 IN | BODY MASS INDEX: 35.08 KG/M2 | WEIGHT: 198 LBS | SYSTOLIC BLOOD PRESSURE: 146 MMHG

## 2021-08-17 DIAGNOSIS — R53.82 CHRONIC FATIGUE: Primary | ICD-10-CM

## 2021-08-17 DIAGNOSIS — F41.9 ANXIETY: ICD-10-CM

## 2021-08-17 DIAGNOSIS — F32.A DEPRESSION, UNSPECIFIED DEPRESSION TYPE: ICD-10-CM

## 2021-08-17 DIAGNOSIS — I10 ESSENTIAL HYPERTENSION: ICD-10-CM

## 2021-08-17 PROCEDURE — 99204 OFFICE O/P NEW MOD 45 MIN: CPT | Performed by: NURSE PRACTITIONER

## 2021-08-17 RX ORDER — NORGESTIMATE AND ETHINYL ESTRADIOL 7DAYSX3 LO
KIT ORAL
COMMUNITY
End: 2021-09-22 | Stop reason: ALTCHOICE

## 2021-08-17 RX ORDER — CITALOPRAM 10 MG/1
10 TABLET ORAL DAILY
Qty: 30 TABLET | Refills: 1 | Status: SHIPPED | OUTPATIENT
Start: 2021-08-17 | End: 2021-11-02 | Stop reason: ALTCHOICE

## 2021-08-17 RX ORDER — LISINOPRIL AND HYDROCHLOROTHIAZIDE 10; 12.5 MG/1; MG/1
1 TABLET ORAL DAILY
Qty: 30 TABLET | Refills: 1 | Status: SHIPPED | OUTPATIENT
Start: 2021-08-17 | End: 2021-11-02 | Stop reason: SDUPTHER

## 2021-08-17 NOTE — PROGRESS NOTES
1. Have you been to the ER, urgent care clinic since your last visit? Hospitalized since your last visit? No    2. Have you seen or consulted any other health care providers outside of the [address] [place] [address] since your last visit? Include any pap smears or colon screening.  No         Health Maintenance Due   Topic Date Due    COVID-19 Vaccine (1) Never done

## 2021-08-17 NOTE — PROGRESS NOTES
The Lilian Pineda 39 y.o. female presents today for:    Chief Complaint   Patient presents with   [facility]    Fatigue     x months     LNMP 8/1/2021; sees Dr. Jeaneth Vila  On birth control  Works for [employer] as a . Complains of fatigue for months. Review of Systems   Constitutional: Positive for malaise/fatigue. Negative for weight loss. HENT: Negative. Negative for hearing loss. Eyes: Negative. Negative for blurred vision and double vision. Respiratory: Negative. Negative for cough, shortness of breath and wheezing. Cardiovascular: Negative. Negative for chest pain, palpitations and leg swelling. Gastrointestinal: Negative. Negative for abdominal pain, blood in stool, constipation, diarrhea, heartburn and vomiting. Genitourinary: Negative. Negative for dysuria, frequency, hematuria and urgency. Musculoskeletal: Positive for back pain (chronic lower back pain). Negative for joint pain, myalgias and neck pain. Skin: Negative. Negative for rash. Neurological: Negative. Negative for dizziness and headaches. Endo/Heme/Allergies: Negative. Does not bruise/bleed easily. Psychiatric/Behavioral: Positive for depression. Negative for suicidal ideas. The patient is nervous/anxious. The patient does not have insomnia. Health Maintenance Due   Topic Date Due    COVID-19 Vaccine (1) Never done        Past Medical History:   Diagnosis Date    Depression affecting pregnancy in second trimester, antepartum 6/20/2017    Nausea and vomiting during pregnancy 11/19/2015    Nausea and vomiting in pregnancy prior to 22 weeks gestation 3/28/2017     Visit Vitals  BP (!) 146/84   Pulse (!) 59   Temp 98.6 °F (37 °C) (Temporal)   Resp 16   Ht 5' 3\" (1.6 m)   Wt 198 lb (89.8 kg)   SpO2 99%   Breastfeeding No   BMI 35.07 kg/m²         Physical Exam  Vitals and nursing note reviewed. Constitutional:       Appearance: She is obese. Neck:      Vascular: No carotid bruit. Cardiovascular:      Rate and Rhythm: Normal rate and regular rhythm. Pulses: Normal pulses. Heart sounds: Normal heart sounds. No murmur heard. Pulmonary:      Effort: Pulmonary effort is normal. No respiratory distress. Breath sounds: Normal breath sounds. No wheezing. Musculoskeletal:         General: No swelling. Normal range of motion. Cervical back: Normal range of motion and neck supple. No rigidity or tenderness. Lymphadenopathy:      Cervical: No cervical adenopathy. Neurological:      Mental Status: She is alert. ASSESSMENT and PLAN    ICD-10-CM ICD-9-CM    1. Chronic fatigue  O12.76 277.14 METABOLIC PANEL, COMPREHENSIVE      TSH 3RD GENERATION      CBC WITH AUTOMATED DIFF      CBC WITH AUTOMATED DIFF      TSH 3RD GENERATION      METABOLIC PANEL, COMPREHENSIVE   2. Anxiety  F41.9 300.00 citalopram (CELEXA) 10 mg tablet   3. Depression, unspecified depression type  F32.9 311 citalopram (CELEXA) 10 mg tablet   4. Essential hypertension  I10 401.9 lisinopril-hydroCHLOROthiazide (PRINZIDE, ZESTORETIC) 10-12.5 mg per tablet     Follow-up and Dispositions    · Return in about 4 weeks (around 9/14/2021).        lab results and schedule of future lab studies reviewed with patient  reviewed diet, exercise and weight control  reviewed medications and side effects in detail    Ken Queen NP

## 2021-08-18 LAB
A-G RATIO,AGRAT: 1.5 RATIO (ref 1.1–2.6)
ABSOLUTE LYMPHOCYTE COUNT, 10803: 1.8 K/UL (ref 1–4.8)
ALBUMIN SERPL-MCNC: 4.3 G/DL (ref 3.5–5)
ALP SERPL-CCNC: 55 U/L (ref 25–115)
ALT SERPL-CCNC: 12 U/L (ref 5–40)
ANION GAP SERPL CALC-SCNC: 14 MMOL/L (ref 3–15)
AST SERPL W P-5'-P-CCNC: 10 U/L (ref 10–37)
BASOPHILS # BLD: 0 K/UL (ref 0–0.2)
BASOPHILS NFR BLD: 0 % (ref 0–2)
BILIRUB SERPL-MCNC: 0.2 MG/DL (ref 0.2–1.2)
BUN SERPL-MCNC: 13 MG/DL (ref 6–22)
CALCIUM SERPL-MCNC: 9 MG/DL (ref 8.4–10.5)
CHLORIDE SERPL-SCNC: 101 MMOL/L (ref 98–110)
CO2 SERPL-SCNC: 24 MMOL/L (ref 20–32)
CREAT SERPL-MCNC: 0.9 MG/DL (ref 0.5–1.2)
EOSINOPHIL # BLD: 0.1 K/UL (ref 0–0.5)
EOSINOPHIL NFR BLD: 2 % (ref 0–6)
ERYTHROCYTE [DISTWIDTH] IN BLOOD BY AUTOMATED COUNT: 12.3 % (ref 10–15.5)
GFRAA, 66117: >60
GFRNA, 66118: >60
GLOBULIN,GLOB: 2.9 G/DL (ref 2–4)
GLUCOSE SERPL-MCNC: 81 MG/DL (ref 70–99)
GRANULOCYTES,GRANS: 54 % (ref 40–75)
HCT VFR BLD AUTO: 41.1 % (ref 35.1–46.5)
HGB BLD-MCNC: 13 G/DL (ref 11.7–15.5)
LYMPHOCYTES, LYMLT: 38 % (ref 20–45)
MCH RBC QN AUTO: 31 PG (ref 26–34)
MCHC RBC AUTO-ENTMCNC: 32 G/DL (ref 31–36)
MCV RBC AUTO: 99 FL (ref 81–99)
MONOCYTES # BLD: 0.3 K/UL (ref 0.1–1)
MONOCYTES NFR BLD: 6 % (ref 3–12)
NEUTROPHILS # BLD AUTO: 2.5 K/UL (ref 1.8–7.7)
PLATELET # BLD AUTO: 271 K/UL (ref 140–440)
PMV BLD AUTO: 12.6 FL (ref 9–13)
POTASSIUM SERPL-SCNC: 3.8 MMOL/L (ref 3.5–5.5)
PROT SERPL-MCNC: 7.2 G/DL (ref 6.4–8.3)
RBC # BLD AUTO: 4.16 M/UL (ref 3.8–5.2)
SODIUM SERPL-SCNC: 139 MMOL/L (ref 133–145)
TSH SERPL DL<=0.005 MIU/L-ACNC: 1.47 MCU/ML (ref 0.27–4.2)
WBC # BLD AUTO: 4.7 K/UL (ref 4–11)

## 2021-08-24 NOTE — PROGRESS NOTES
1st attempt to call patient to inform of labs. No answer; left voicemail for patient to call office back.

## 2021-08-25 NOTE — PROGRESS NOTES
2nd Attempt, called patient to inform of labs. No answer; left voicemail for patient to call office back.

## 2021-09-14 ENCOUNTER — OFFICE VISIT (OUTPATIENT)
Dept: FAMILY MEDICINE CLINIC | Age: 36
End: 2021-09-14
Payer: COMMERCIAL

## 2021-09-14 VITALS
HEIGHT: 63 IN | OXYGEN SATURATION: 97 % | DIASTOLIC BLOOD PRESSURE: 99 MMHG | RESPIRATION RATE: 16 BRPM | BODY MASS INDEX: 34.66 KG/M2 | TEMPERATURE: 98.4 F | HEART RATE: 70 BPM | WEIGHT: 195.6 LBS | SYSTOLIC BLOOD PRESSURE: 168 MMHG

## 2021-09-14 DIAGNOSIS — R14.0 BLOATING: ICD-10-CM

## 2021-09-14 DIAGNOSIS — R53.82 CHRONIC FATIGUE: ICD-10-CM

## 2021-09-14 DIAGNOSIS — E66.01 SEVERE OBESITY (HCC): ICD-10-CM

## 2021-09-14 DIAGNOSIS — F32.A DEPRESSION, UNSPECIFIED DEPRESSION TYPE: ICD-10-CM

## 2021-09-14 DIAGNOSIS — I10 ESSENTIAL HYPERTENSION: Primary | ICD-10-CM

## 2021-09-14 PROCEDURE — 99214 OFFICE O/P EST MOD 30 MIN: CPT | Performed by: NURSE PRACTITIONER

## 2021-09-14 NOTE — PROGRESS NOTES
The Lilian Connolly Fought 39 y.o. female presents today for:    Chief Complaint   Patient presents with    Follow-up    Labs     Eating better, lost 3 pounds; cut out fast food. Did not  BP medications. LNMP 8/31/2021    Review of Systems   Constitutional: Positive for malaise/fatigue. Negative for weight loss. HENT: Negative. Negative for hearing loss. Eyes: Negative. Negative for blurred vision and double vision. Respiratory: Negative. Negative for cough, shortness of breath and wheezing. Cardiovascular: Negative. Negative for chest pain, palpitations and leg swelling. Gastrointestinal: Negative. Negative for abdominal pain, blood in stool, constipation, diarrhea, heartburn and vomiting. Genitourinary: Negative. Negative for dysuria, frequency, hematuria and urgency. Musculoskeletal: Positive for back pain (chronic lower back pain). Negative for joint pain, myalgias and neck pain. Skin: Negative. Negative for rash. Neurological: Negative. Negative for dizziness and headaches. Endo/Heme/Allergies: Negative. Does not bruise/bleed easily. Psychiatric/Behavioral: Positive for depression. Negative for suicidal ideas. The patient is nervous/anxious. The patient does not have insomnia. Health Maintenance Due   Topic Date Due    COVID-19 Vaccine (1) Never done    Flu Vaccine (1) 09/01/2021        Past Medical History:   Diagnosis Date    Depression affecting pregnancy in second trimester, antepartum 6/20/2017    Nausea and vomiting during pregnancy 11/19/2015    Nausea and vomiting in pregnancy prior to 22 weeks gestation 3/28/2017       Physical Exam  Vitals and nursing note reviewed. Constitutional:       Appearance: She is obese. Neck:      Vascular: No carotid bruit. Cardiovascular:      Rate and Rhythm: Normal rate and regular rhythm. Pulses: Normal pulses. Heart sounds: Normal heart sounds. No murmur heard.      Pulmonary:      Effort: Pulmonary effort is normal. No respiratory distress. Breath sounds: Normal breath sounds. No wheezing. Musculoskeletal:         General: No swelling. Normal range of motion. Cervical back: Normal range of motion and neck supple. No rigidity or tenderness. Lymphadenopathy:      Cervical: No cervical adenopathy. Skin:     General: Skin is warm. Neurological:      General: No focal deficit present. Mental Status: She is alert and oriented to person, place, and time. Visit Vitals  BP (!) 168/99   Pulse 70   Temp 98.4 °F (36.9 °C) (Temporal)   Resp 16   Ht 5' 3\" (1.6 m)   Wt 195 lb 9.6 oz (88.7 kg)   SpO2 97%   BMI 34.65 kg/m²         ASSESSMENT and PLAN    ICD-10-CM ICD-9-CM    1. Essential hypertension  I10 401.9    2. Bloating  R14.0 787.3    3. Severe obesity (HCC)  E66.01 278.01 LIPID PANEL      LIPID PANEL   4. Depression, unspecified depression type  F32.9 311    5. Chronic fatigue  R53.82 780.79      Follow-up and Dispositions    · Return in about 2 weeks (around 9/28/2021) for nurse visit for BP check.        lab results and schedule of future lab studies reviewed with patient  reviewed diet, exercise and weight control  cardiovascular risk and specific lipid/LDL goals reviewed  reviewed medications and side effects in detail    Debbie Gomez NP

## 2021-09-14 NOTE — PROGRESS NOTES
Patient declined Flu vaccine today. Patient do not have covid vaccine. Patient did not  prescription from pharmacy yet. Madi Stinson presents today for   Chief Complaint   Patient presents with   [address]       Is someone accompanying this pt? no    Is the patient using any DME equipment during OV? no    Depression Screening:  3 most recent PHQ Screens 9/14/2021   Little interest or pleasure in doing things Several days   Feeling down, depressed, irritable, or hopeless Several days   Total Score PHQ 2 2       Learning Assessment:  Learning Assessment 8/17/2021   PRIMARY LEARNER Patient   HIGHEST LEVEL OF EDUCATION - PRIMARY LEARNER  2 YEARS OF COLLEGE   BARRIERS PRIMARY LEARNER NONE   CO-LEARNER CAREGIVER No   PRIMARY LANGUAGE ENGLISH   LEARNER PREFERENCE PRIMARY READING   ANSWERED BY patient   RELATIONSHIP SELF       Health Maintenance reviewed and discussed and ordered per Provider. Health Maintenance Due   Topic Date Due    COVID-19 Vaccine (1) Never done    Flu Vaccine (1) 09/01/2021   . Coordination of Care:  1. Have you been to the ER, urgent care clinic since your last visit? Hospitalized since your last visit? no    2. Have you seen or consulted any other health care providers outside of the [address] [place] [address] since your last visit? Include any pap smears or colon screening.  no    Health Maintenance Due   Topic Date Due    COVID-19 Vaccine (1) Never done    Flu Vaccine (1) 09/01/2021

## 2021-09-15 LAB
CHOLEST SERPL-MCNC: 107 MG/DL (ref 110–200)
HDLC SERPL-MCNC: 2.6 MG/DL (ref 0–5)
HDLC SERPL-MCNC: 41 MG/DL
LDL/HDL RATIO,LDHD: 1.2
LDLC SERPL CALC-MCNC: 51 MG/DL (ref 50–99)
NON-HDL CHOLESTEROL, 011976: 66 MG/DL
TRIGL SERPL-MCNC: 76 MG/DL (ref 40–149)
VLDLC SERPL CALC-MCNC: 15 MG/DL (ref 8–30)

## 2021-09-28 ENCOUNTER — CLINICAL SUPPORT (OUTPATIENT)
Dept: FAMILY MEDICINE CLINIC | Age: 36
End: 2021-09-28

## 2021-09-28 VITALS
OXYGEN SATURATION: 98 % | BODY MASS INDEX: 34.48 KG/M2 | TEMPERATURE: 98.4 F | HEIGHT: 63 IN | DIASTOLIC BLOOD PRESSURE: 84 MMHG | WEIGHT: 194.6 LBS | RESPIRATION RATE: 16 BRPM | HEART RATE: 65 BPM | SYSTOLIC BLOOD PRESSURE: 132 MMHG

## 2021-09-28 DIAGNOSIS — Z01.30 BP CHECK: Primary | ICD-10-CM

## 2021-09-28 NOTE — PROGRESS NOTES
Per BOBBY Talamantes instructions patient presents today for a blood pressure check. Patient seated and resting for 15 minutes with both feet flat on the floor. Blood pressure taken and documented. Reported blood pressure to BOBBY Talamantes. Patient states she did not take BP medication today. Patient is to follow up in 1 month.

## 2021-11-02 ENCOUNTER — VIRTUAL VISIT (OUTPATIENT)
Dept: FAMILY MEDICINE CLINIC | Age: 36
End: 2021-11-02
Payer: COMMERCIAL

## 2021-11-02 DIAGNOSIS — M54.50 ACUTE BILATERAL LOW BACK PAIN, UNSPECIFIED WHETHER SCIATICA PRESENT: Primary | ICD-10-CM

## 2021-11-02 DIAGNOSIS — I10 ESSENTIAL HYPERTENSION: ICD-10-CM

## 2021-11-02 DIAGNOSIS — F32.A DEPRESSION, UNSPECIFIED DEPRESSION TYPE: ICD-10-CM

## 2021-11-02 DIAGNOSIS — F41.9 ANXIETY: ICD-10-CM

## 2021-11-02 PROCEDURE — 99213 OFFICE O/P EST LOW 20 MIN: CPT | Performed by: NURSE PRACTITIONER

## 2021-11-02 RX ORDER — CITALOPRAM 20 MG/1
20 TABLET, FILM COATED ORAL DAILY
Qty: 30 TABLET | Refills: 1 | Status: SHIPPED | OUTPATIENT
Start: 2021-11-02 | End: 2022-03-28 | Stop reason: ALTCHOICE

## 2021-11-02 RX ORDER — LISINOPRIL AND HYDROCHLOROTHIAZIDE 10; 12.5 MG/1; MG/1
1 TABLET ORAL DAILY
Qty: 30 TABLET | Refills: 1 | Status: SHIPPED | OUTPATIENT
Start: 2021-11-02 | End: 2022-02-22 | Stop reason: SDUPTHER

## 2021-11-02 RX ORDER — CITALOPRAM 10 MG/1
10 TABLET ORAL DAILY
Qty: 30 TABLET | Refills: 1 | Status: CANCELLED | OUTPATIENT
Start: 2021-11-02

## 2021-11-02 RX ORDER — METHOCARBAMOL 500 MG/1
500 TABLET, FILM COATED ORAL
Qty: 42 TABLET | Refills: 1 | Status: SHIPPED | OUTPATIENT
Start: 2021-11-02 | End: 2021-11-30

## 2021-11-02 NOTE — PROGRESS NOTES
Patient states her last menstrual was October 26, 2021 and she had a lot of clots. Dorann Flank presents today for   Chief Complaint   Patient presents with    Follow-up    Hypertension       Virtual/telephone visit    Depression Screening:  3 most recent PHQ Screens 11/2/2021   Little interest or pleasure in doing things Not at all   Feeling down, depressed, irritable, or hopeless Not at all   Total Score PHQ 2 0       Learning Assessment:  Learning Assessment 8/17/2021   PRIMARY LEARNER Patient   HIGHEST LEVEL OF EDUCATION - PRIMARY LEARNER  2 YEARS OF COLLEGE   BARRIERS PRIMARY LEARNER NONE   CO-LEARNER CAREGIVER No   PRIMARY LANGUAGE ENGLISH   LEARNER PREFERENCE PRIMARY READING   ANSWERED BY patient   RELATIONSHIP SELF       Health Maintenance reviewed and discussed and ordered per Provider. Health Maintenance Due   Topic Date Due    COVID-19 Vaccine (1) Never done    Flu Vaccine (1) 09/01/2021   . Coordination of Care:  1. Have you been to the ER, urgent care clinic since your last visit? Hospitalized since your last visit? no    2. Have you seen or consulted any other health care providers outside of the [address] [place] [address] since your last visit? Include any pap smears or colon screening.  no

## 2021-11-02 NOTE — PROGRESS NOTES
Jose Luis Light (: 1985) is a 39 y.o. female, established patient, here for evaluation of the following chief complaint(s):   Follow-up and Hypertension     LNMP 10/26/2021    Works from home; go in occasionally every Friday. Lower back pain; past month. Denies any numbness or tingling. Denies difficulty urinating or BM. Mattress; less than 11years old. Pt likes the Celexa but requesting increase dose. Denies SI/HI. ASSESSMENT/PLAN:  Below is the assessment and plan developed based on review of pertinent history, labs, studies, and medications. 1. Acute bilateral low back pain, unspecified whether sciatica present  -     methocarbamoL (ROBAXIN) 500 mg tablet; Take 1 Tablet by mouth three (3) times daily as needed for Muscle Spasm(s) for up to 28 days. , Normal, Disp-42 Tablet, R-1  2. Essential hypertension  -     lisinopril-hydroCHLOROthiazide (PRINZIDE, ZESTORETIC) 10-12.5 mg per tablet; Take 1 Tablet by mouth daily. , Normal, Disp-30 Tablet, R-1  3. Anxiety  -     citalopram (CELEXA) 20 mg tablet; Take 1 Tablet by mouth daily. , Normal, Disp-30 Tablet, R-1  4. Depression, unspecified depression type  -     citalopram (CELEXA) 20 mg tablet; Take 1 Tablet by mouth daily. , Normal, Disp-30 Tablet, R-1      No follow-ups on file. SUBJECTIVE/OBJECTIVE:  HPI    Review of Systems   Constitutional: Negative for activity change, appetite change and fever. HENT: Negative for congestion. Respiratory: Negative for cough, chest tightness, shortness of breath and wheezing. Cardiovascular: Negative for chest pain, palpitations and leg swelling. Gastrointestinal: Negative for abdominal pain. Genitourinary: Negative for difficulty urinating. Musculoskeletal: Positive for back pain. Negative for joint swelling, myalgias and neck pain. Neurological: Negative for dizziness. Psychiatric/Behavioral: Positive for dysphoric mood. Negative for agitation and suicidal ideas.  The patient is not nervous/anxious. No data recorded     Physical Exam    [INSTRUCTIONS:  \"[x]\" Indicates a positive item  \"[]\" Indicates a negative item  -- DELETE ALL ITEMS NOT EXAMINED]    Constitutional: [x] Appears well-developed and well-nourished [x] No apparent distress      [] Abnormal -     Mental status: [x] Alert and awake  [x] Oriented to person/place/time [x] Able to follow commands    [] Abnormal -     Eyes:   EOM    [x]  Normal    [] Abnormal -   Sclera  [x]  Normal    [] Abnormal -          Discharge [x]  None visible   [] Abnormal -     HENT: [x] Normocephalic, atraumatic  [] Abnormal -   [x] Mouth/Throat: Mucous membranes are moist    External Ears [x] Normal  [] Abnormal -    Neck: [x] No visualized mass [] Abnormal -     Pulmonary/Chest: [x] Respiratory effort normal   [x] No visualized signs of difficulty breathing or respiratory distress        [] Abnormal -      Musculoskeletal:   [x] Normal gait with no signs of ataxia         [x] Normal range of motion of neck        [] Abnormal -     Neurological:        [x] No Facial Asymmetry (Cranial nerve 7 motor function) (limited exam due to video visit)          [x] No gaze palsy        [] Abnormal -          Skin:        [x] No significant exanthematous lesions or discoloration noted on facial skin         [] Abnormal -            Psychiatric:       [x] Normal Affect [] Abnormal -        [x] No Hallucinations    Other pertinent observable physical exam findings:-        On this date 11/02/2021 I have spent 15 minutes reviewing previous notes, test results and face to face (virtual) with the patient discussing the diagnosis and importance of compliance with the treatment plan as well as documenting on the day of the visitJoanna San was evaluated through a synchronous (real-time) audio-video encounter. The patient (or guardian if applicable) is aware that this is a billable service.  Verbal consent to proceed has been obtained within the past 12 months. The visit was conducted pursuant to the emergency declaration under the [facility], [address] [place] [address] authority and the [employer] Act. Patient identification was verified, and a caregiver was present when appropriate. The patient was located in a state where the provider was credentialed to provide care. An electronic signature was used to authenticate this note.   -- Walter Burnett NP - - -

## 2022-02-22 DIAGNOSIS — I10 ESSENTIAL HYPERTENSION: ICD-10-CM

## 2022-02-22 RX ORDER — LISINOPRIL AND HYDROCHLOROTHIAZIDE 10; 12.5 MG/1; MG/1
1 TABLET ORAL DAILY
Qty: 30 TABLET | Refills: 0 | Status: SHIPPED | OUTPATIENT
Start: 2022-02-22 | End: 2022-03-28 | Stop reason: SDUPTHER

## 2022-03-19 PROBLEM — E66.01 SEVERE OBESITY (HCC): Status: ACTIVE | Noted: 2020-03-20

## 2022-03-28 ENCOUNTER — OFFICE VISIT (OUTPATIENT)
Dept: FAMILY MEDICINE CLINIC | Age: 37
End: 2022-03-28
Payer: COMMERCIAL

## 2022-03-28 VITALS
SYSTOLIC BLOOD PRESSURE: 134 MMHG | BODY MASS INDEX: 34.87 KG/M2 | RESPIRATION RATE: 16 BRPM | TEMPERATURE: 98.4 F | OXYGEN SATURATION: 98 % | HEIGHT: 63 IN | WEIGHT: 196.8 LBS | HEART RATE: 65 BPM | DIASTOLIC BLOOD PRESSURE: 88 MMHG

## 2022-03-28 DIAGNOSIS — I10 ESSENTIAL HYPERTENSION: ICD-10-CM

## 2022-03-28 DIAGNOSIS — L02.426 FURUNCLE OF LEFT THIGH: Primary | ICD-10-CM

## 2022-03-28 PROCEDURE — 99213 OFFICE O/P EST LOW 20 MIN: CPT | Performed by: NURSE PRACTITIONER

## 2022-03-28 RX ORDER — MUPIROCIN 20 MG/G
OINTMENT TOPICAL DAILY
Qty: 22 G | Refills: 0 | Status: SHIPPED | OUTPATIENT
Start: 2022-03-28

## 2022-03-28 RX ORDER — LISINOPRIL AND HYDROCHLOROTHIAZIDE 10; 12.5 MG/1; MG/1
1 TABLET ORAL DAILY
Qty: 90 TABLET | Refills: 1 | Status: SHIPPED | OUTPATIENT
Start: 2022-03-28

## 2022-03-28 RX ORDER — CLINDAMYCIN HYDROCHLORIDE 300 MG/1
300 CAPSULE ORAL 3 TIMES DAILY
Qty: 30 CAPSULE | Refills: 0 | Status: SHIPPED | OUTPATIENT
Start: 2022-03-28 | End: 2022-04-07

## 2022-03-28 NOTE — PROGRESS NOTES
Patient did not take BP medication this morning. Patient declined Flu vaccine today. Patient do not have covid vaccine. Santos Ann presents today for   Chief Complaint   Patient presents with    Cyst     left thigh, hair bump        Is someone accompanying this pt? no    Is the patient using any DME equipment during [address]? no    Depression Screening:  3 most recent PHQ Screens 11/2/2021   Little interest or pleasure in doing things Not at all   Feeling down, depressed, irritable, or hopeless Not at all   Total Score PHQ 2 0       Learning Assessment:  Learning Assessment 8/17/2021   PRIMARY LEARNER Patient   HIGHEST LEVEL OF EDUCATION - PRIMARY LEARNER  2 YEARS OF COLLEGE   BARRIERS PRIMARY LEARNER NONE   CO-LEARNER CAREGIVER No   PRIMARY LANGUAGE ENGLISH   LEARNER PREFERENCE PRIMARY READING   ANSWERED BY patient   RELATIONSHIP SELF       Health Maintenance reviewed and discussed and ordered per Provider. Health Maintenance Due   Topic Date Due    COVID-19 Vaccine (1) Never done    Flu Vaccine (1) 09/01/2021   . Coordination of Care:  1. Have you been to the ER, urgent care clinic since your last visit? Hospitalized since your last visit? no    2. Have you seen or consulted any other health care providers outside of the [address] [place] [address] since your last visit? Include any pap smears or colon screening. No      3. For patients aged 39-70: Has the patient had a colonoscopy / FIT/ Cologuard? NA - based on age      If the patient is female:    4. For patients aged 41-77: Has the patient had a mammogram within the past 2 years? NA - based on age or sex      11. For patients aged 21-65: Has the patient had a pap smear?  Yes - no Care Gap present

## 2022-03-28 NOTE — PROGRESS NOTES
The Lilian Romeo 44 y.o. female presents today for:    Chief Complaint   Patient presents with    Cyst     left thigh, hair bump    LNMP 2-    Patient states has been there about 1-2 months.   --3 bumps in the inner thigh  --tenderness, some pus exudate     Review of Systems   Constitutional: Negative for chills, fever, malaise/fatigue and weight loss. HENT: Negative. Eyes: Negative. Respiratory: Negative. Cardiovascular: Negative. Gastrointestinal: Negative. Genitourinary: Negative. Musculoskeletal: Negative. Skin:        Furuncle to left thigh       Health Maintenance Due   Topic Date Due    COVID-19 Vaccine (1) Never done    Flu Vaccine (1) 09/01/2021        Past Medical History:   Diagnosis Date    Depression affecting pregnancy in second trimester, antepartum 6/20/2017    Nausea and vomiting during pregnancy 11/19/2015    Nausea and vomiting in pregnancy prior to 22 weeks gestation 3/28/2017       Physical Exam  Constitutional:       General: She is not in acute distress. Appearance: Normal appearance. She is not toxic-appearing. Cardiovascular:      Rate and Rhythm: Normal rate and regular rhythm. Pulses: Normal pulses. Heart sounds: Normal heart sounds. Pulmonary:      Effort: No respiratory distress. Breath sounds: Normal breath sounds. Musculoskeletal:         General: Swelling present. Skin:     Findings: Abscess and erythema present. Neurological:      Mental Status: She is alert. Visit Vitals  /88   Pulse 65   Temp 98.4 °F (36.9 °C) (Temporal)   Resp 16   Ht 5' 3\" (1.6 m)   Wt 196 lb 12.8 oz (89.3 kg)   SpO2 98%   BMI 34.86 kg/m²       Current Outpatient Medications:     mupirocin (BACTROBAN) 2 % ointment, Apply  to affected area daily. , Disp: 22 g, Rfl: 0    lisinopril-hydroCHLOROthiazide (PRINZIDE, ZESTORETIC) 10-12.5 mg per tablet, Take 1 Tablet by mouth daily. , Disp: 90 Tablet, Rfl: 1    There are no diagnoses linked to this encounter. ASSESSMENT and PLAN    ICD-10-CM ICD-9-CM    1. Furuncle of left thigh  L02.426 680.6 mupirocin (BACTROBAN) 2 % ointment      clindamycin (CLEOCIN) 300 mg capsule   2. Essential hypertension  I10 401.9 lisinopril-hydroCHLOROthiazide (PRINZIDE, ZESTORETIC) 10-12.5 mg per tablet     Follow-up and Dispositions    · Return in about 3 months (around 6/28/2022).        lab results and schedule of future lab studies reviewed with patient  reviewed diet, exercise and weight control  cardiovascular risk and specific lipid/LDL goals reviewed    Maximino Conde NP

## 2022-11-22 DIAGNOSIS — I10 ESSENTIAL HYPERTENSION: ICD-10-CM

## 2022-11-23 RX ORDER — LISINOPRIL AND HYDROCHLOROTHIAZIDE 10; 12.5 MG/1; MG/1
1 TABLET ORAL DAILY
Qty: 90 TABLET | Refills: 1 | Status: SHIPPED | OUTPATIENT
Start: 2022-11-23

## 2023-01-05 ENCOUNTER — OFFICE VISIT (OUTPATIENT)
Dept: FAMILY MEDICINE CLINIC | Age: 38
End: 2023-01-05
Payer: COMMERCIAL

## 2023-01-05 VITALS
BODY MASS INDEX: 34.98 KG/M2 | HEART RATE: 84 BPM | DIASTOLIC BLOOD PRESSURE: 90 MMHG | TEMPERATURE: 98.3 F | HEIGHT: 63 IN | OXYGEN SATURATION: 98 % | WEIGHT: 197.4 LBS | RESPIRATION RATE: 16 BRPM | SYSTOLIC BLOOD PRESSURE: 145 MMHG

## 2023-01-05 DIAGNOSIS — Z83.42 FAMILY HISTORY OF HIGH CHOLESTEROL: ICD-10-CM

## 2023-01-05 DIAGNOSIS — I10 ESSENTIAL HYPERTENSION: Primary | ICD-10-CM

## 2023-01-05 DIAGNOSIS — F41.9 ANXIETY: ICD-10-CM

## 2023-01-05 DIAGNOSIS — H61.23 BILATERAL IMPACTED CERUMEN: ICD-10-CM

## 2023-01-05 DIAGNOSIS — F32.A DEPRESSION, UNSPECIFIED DEPRESSION TYPE: ICD-10-CM

## 2023-01-05 DIAGNOSIS — Z13.29 SCREENING FOR THYROID DISORDER: ICD-10-CM

## 2023-01-05 DIAGNOSIS — Z13.89 SCREENING FOR HEMATURIA OR PROTEINURIA: ICD-10-CM

## 2023-01-05 DIAGNOSIS — Z13.0 SCREENING FOR DEFICIENCY ANEMIA: ICD-10-CM

## 2023-01-05 PROCEDURE — 3074F SYST BP LT 130 MM HG: CPT | Performed by: NURSE PRACTITIONER

## 2023-01-05 PROCEDURE — 3078F DIAST BP <80 MM HG: CPT | Performed by: NURSE PRACTITIONER

## 2023-01-05 PROCEDURE — 99214 OFFICE O/P EST MOD 30 MIN: CPT | Performed by: NURSE PRACTITIONER

## 2023-01-05 RX ORDER — HYDROXYZINE PAMOATE 25 MG/1
25 CAPSULE ORAL
Qty: 42 CAPSULE | Refills: 0 | Status: SHIPPED | OUTPATIENT
Start: 2023-01-05 | End: 2023-01-19

## 2023-01-05 NOTE — PROGRESS NOTES
Patient declined the flu vaccine. Patient do not have the covid vaccine. Patient last menstrual cycle was December 26. Patient did not take medication today. Yamilex Love presents today for   Chief Complaint   Patient presents with    Follow-up       Is someone accompanying this pt? no  Is the patient using any DME equipment during OV? No     Depression Screening:  3 most recent PHQ Screens 1/5/2023   Little interest or pleasure in doing things Not at all   Feeling down, depressed, irritable, or hopeless Several days   Total Score PHQ 2 1       Learning Assessment:  Learning Assessment 8/17/2021   PRIMARY LEARNER Patient   HIGHEST LEVEL OF EDUCATION - PRIMARY LEARNER  2 YEARS OF COLLEGE   BARRIERS PRIMARY LEARNER NONE   CO-LEARNER CAREGIVER No   PRIMARY LANGUAGE ENGLISH   LEARNER PREFERENCE PRIMARY READING   ANSWERED BY patient   RELATIONSHIP SELF       Health Maintenance reviewed and discussed and ordered per Provider. Health Maintenance Due   Topic Date Due    COVID-19 Vaccine (1) Never done    Flu Vaccine (1) 08/01/2022    Depression Monitoring  11/02/2022   . Coordination of Care:  1. Have you been to the ER, urgent care clinic since your last visit? Hospitalized since your last visit? Yes     2. Have you seen or consulted any other health care providers outside of the [address] [place] [address] since your last visit? Include any pap smears or colon screening. No       3. For patients aged 39-70: Has the patient had a colonoscopy / FIT/ Cologuard? NA - based on age      If the patient is female:    4. For patients aged 41-77: Has the patient had a mammogram within the past 2 years? NA - based on age or sex      11. For patients aged 21-65: Has the patient had a pap smear?  No
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
## 2023-01-06 LAB
A-G RATIO,AGRAT: 1.4 RATIO (ref 1.1–2.6)
ABSOLUTE LYMPHOCYTE COUNT, 10803: 1.4 K/UL (ref 1–4.8)
ALBUMIN SERPL-MCNC: 4.2 G/DL (ref 3.5–5)
ALP SERPL-CCNC: 62 U/L (ref 25–115)
ALT SERPL-CCNC: 14 U/L (ref 5–40)
ANION GAP SERPL CALC-SCNC: 14 MMOL/L (ref 3–15)
AST SERPL W P-5'-P-CCNC: 15 U/L (ref 10–37)
BACTERIA,BACTU: PRESENT
BASOPHILS # BLD: 0 K/UL (ref 0–0.2)
BASOPHILS NFR BLD: 1 % (ref 0–2)
BILIRUB SERPL-MCNC: 0.2 MG/DL (ref 0.2–1.2)
BILIRUB UR QL: NEGATIVE
BUN SERPL-MCNC: 10 MG/DL (ref 6–22)
CALCIUM SERPL-MCNC: 9.2 MG/DL (ref 8.4–10.5)
CHLORIDE SERPL-SCNC: 100 MMOL/L (ref 98–110)
CLARITY: ABNORMAL
CO2 SERPL-SCNC: 25 MMOL/L (ref 20–32)
COLOR UR: YELLOW
CREAT SERPL-MCNC: 0.9 MG/DL (ref 0.5–1.2)
EOSINOPHIL # BLD: 0 K/UL (ref 0–0.5)
EOSINOPHIL NFR BLD: 0 % (ref 0–6)
EPITHELIAL,EPSU: ABNORMAL /HPF
ERYTHROCYTE [DISTWIDTH] IN BLOOD BY AUTOMATED COUNT: 12.2 % (ref 10–15.5)
GLOBULIN,GLOB: 2.9 G/DL (ref 2–4)
GLOMERULAR FILTRATION RATE: >60 ML/MIN/1.73 SQ.M.
GLUCOSE SERPL-MCNC: 90 MG/DL (ref 70–99)
GLUCOSE UR QL: NEGATIVE MG/DL
GRANULOCYTES,GRANS: 55 % (ref 40–75)
HCT VFR BLD AUTO: 40.9 % (ref 35.1–46.5)
HGB BLD-MCNC: 13.5 G/DL (ref 11.7–15.5)
HGB UR QL STRIP: NEGATIVE
HYLINE CAST, 6014: ABNORMAL /LPF (ref 0–2)
KETONES UR QL STRIP.AUTO: ABNORMAL MG/DL
LEUKOCYTE ESTERASE: NEGATIVE
LYMPHOCYTES, LYMLT: 35 % (ref 20–45)
MCH RBC QN AUTO: 32 PG (ref 26–34)
MCHC RBC AUTO-ENTMCNC: 33 G/DL (ref 31–36)
MCV RBC AUTO: 98 FL (ref 80–99)
MONOCYTES # BLD: 0.3 K/UL (ref 0.1–1)
MONOCYTES NFR BLD: 8 % (ref 3–12)
NEUTROPHILS # BLD AUTO: 2.2 K/UL (ref 1.8–7.7)
NITRITE UR QL STRIP.AUTO: NEGATIVE
PH UR STRIP: 6 PH (ref 5–8)
PLATELET # BLD AUTO: 334 K/UL (ref 140–440)
PMV BLD AUTO: 11.5 FL (ref 9–13)
POTASSIUM SERPL-SCNC: 3.5 MMOL/L (ref 3.5–5.5)
PROT SERPL-MCNC: 7.1 G/DL (ref 6.4–8.3)
PROT UR QL STRIP: NEGATIVE MG/DL
RBC # BLD AUTO: 4.19 M/UL (ref 3.8–5.2)
RBC #/AREA URNS HPF: ABNORMAL /HPF
SODIUM SERPL-SCNC: 139 MMOL/L (ref 133–145)
SP GR UR: 1.02 (ref 1–1.03)
TSH SERPL DL<=0.005 MIU/L-ACNC: 1 MCU/ML (ref 0.27–4.2)
UROBILINOGEN UR STRIP-MCNC: 1 MG/DL
WBC # BLD AUTO: 4 K/UL (ref 4–11)
WBC URNS QL MICRO: ABNORMAL /HPF (ref 0–5)

## 2023-05-08 PROBLEM — K85.90 ACUTE PANCREATITIS: Status: ACTIVE | Noted: 2023-03-04

## 2023-05-25 RX ORDER — LORATADINE 10 MG/1
10 TABLET ORAL DAILY
COMMUNITY
Start: 2023-05-08 | End: 2023-06-05 | Stop reason: ALTCHOICE

## 2023-05-25 RX ORDER — LISINOPRIL AND HYDROCHLOROTHIAZIDE 12.5; 1 MG/1; MG/1
TABLET ORAL
Qty: 90 TABLET | OUTPATIENT
Start: 2023-05-25

## 2023-05-25 RX ORDER — LISINOPRIL AND HYDROCHLOROTHIAZIDE 12.5; 1 MG/1; MG/1
1 TABLET ORAL DAILY
Qty: 30 TABLET | Refills: 0 | Status: SHIPPED | OUTPATIENT
Start: 2023-05-25

## 2023-06-05 PROBLEM — Z90.49 S/P LAPAROSCOPIC CHOLECYSTECTOMY: Status: ACTIVE | Noted: 2023-06-05

## 2023-06-05 PROBLEM — I10 ESSENTIAL (PRIMARY) HYPERTENSION: Status: ACTIVE | Noted: 2023-01-05

## 2023-06-05 PROBLEM — Q35.1: Status: ACTIVE | Noted: 2023-06-05

## 2023-06-05 PROBLEM — B37.9 YEAST INFECTION: Status: ACTIVE | Noted: 2023-06-05

## 2023-06-05 PROBLEM — Z87.19 HISTORY OF PANCREATITIS: Status: ACTIVE | Noted: 2023-06-05

## 2023-06-05 PROBLEM — R79.89 ELEVATED LFTS: Status: ACTIVE | Noted: 2023-06-05

## 2023-07-15 DIAGNOSIS — I10 ESSENTIAL (PRIMARY) HYPERTENSION: ICD-10-CM

## 2023-07-17 RX ORDER — LISINOPRIL AND HYDROCHLOROTHIAZIDE 12.5; 1 MG/1; MG/1
TABLET ORAL
Qty: 90 TABLET | Refills: 0 | Status: SHIPPED | OUTPATIENT
Start: 2023-07-17

## 2023-09-13 DIAGNOSIS — B37.9 YEAST INFECTION: ICD-10-CM

## 2023-09-14 RX ORDER — FLUCONAZOLE 150 MG/1
TABLET ORAL
Qty: 1 TABLET | Refills: 0 | OUTPATIENT
Start: 2023-09-14

## 2023-09-24 SDOH — ECONOMIC STABILITY: FOOD INSECURITY: WITHIN THE PAST 12 MONTHS, THE FOOD YOU BOUGHT JUST DIDN'T LAST AND YOU DIDN'T HAVE MONEY TO GET MORE.: OFTEN TRUE

## 2023-09-24 SDOH — ECONOMIC STABILITY: HOUSING INSECURITY
IN THE LAST 12 MONTHS, WAS THERE A TIME WHEN YOU DID NOT HAVE A STEADY PLACE TO SLEEP OR SLEPT IN A SHELTER (INCLUDING NOW)?: NO

## 2023-09-24 SDOH — ECONOMIC STABILITY: FOOD INSECURITY: WITHIN THE PAST 12 MONTHS, YOU WORRIED THAT YOUR FOOD WOULD RUN OUT BEFORE YOU GOT MONEY TO BUY MORE.: SOMETIMES TRUE

## 2023-09-24 SDOH — ECONOMIC STABILITY: INCOME INSECURITY: HOW HARD IS IT FOR YOU TO PAY FOR THE VERY BASICS LIKE FOOD, HOUSING, MEDICAL CARE, AND HEATING?: HARD

## 2023-09-24 SDOH — ECONOMIC STABILITY: TRANSPORTATION INSECURITY
IN THE PAST 12 MONTHS, HAS LACK OF TRANSPORTATION KEPT YOU FROM MEETINGS, WORK, OR FROM GETTING THINGS NEEDED FOR DAILY LIVING?: NO

## 2023-09-25 ENCOUNTER — OFFICE VISIT (OUTPATIENT)
Facility: CLINIC | Age: 38
End: 2023-09-25

## 2023-09-25 VITALS
HEART RATE: 77 BPM | OXYGEN SATURATION: 95 % | HEIGHT: 63 IN | WEIGHT: 192.2 LBS | BODY MASS INDEX: 34.05 KG/M2 | DIASTOLIC BLOOD PRESSURE: 81 MMHG | SYSTOLIC BLOOD PRESSURE: 125 MMHG | TEMPERATURE: 98.7 F | RESPIRATION RATE: 16 BRPM

## 2023-09-25 DIAGNOSIS — B96.89 BACTERIAL VAGINOSIS: ICD-10-CM

## 2023-09-25 DIAGNOSIS — L02.224 BOIL, GROIN: ICD-10-CM

## 2023-09-25 DIAGNOSIS — B37.9 YEAST INFECTION: ICD-10-CM

## 2023-09-25 DIAGNOSIS — N76.0 BACTERIAL VAGINOSIS: ICD-10-CM

## 2023-09-25 DIAGNOSIS — L23.9 ALLERGIC DERMATITIS: Primary | ICD-10-CM

## 2023-09-25 RX ORDER — FLUCONAZOLE 150 MG/1
150 TABLET ORAL ONCE
Qty: 1 TABLET | Refills: 0 | Status: SHIPPED | OUTPATIENT
Start: 2023-09-25 | End: 2023-09-25

## 2023-09-25 RX ORDER — METRONIDAZOLE 500 MG/1
500 TABLET ORAL 2 TIMES DAILY
Qty: 14 TABLET | Refills: 0 | Status: SHIPPED | OUTPATIENT
Start: 2023-09-25 | End: 2023-10-02

## 2023-09-25 RX ORDER — CLINDAMYCIN PHOSPHATE 10 MG/G
GEL TOPICAL
Qty: 30 G | Refills: 2 | Status: SHIPPED | OUTPATIENT
Start: 2023-09-25 | End: 2023-10-02

## 2023-09-25 ASSESSMENT — PATIENT HEALTH QUESTIONNAIRE - PHQ9
3. TROUBLE FALLING OR STAYING ASLEEP: 2
SUM OF ALL RESPONSES TO PHQ QUESTIONS 1-9: 7
SUM OF ALL RESPONSES TO PHQ QUESTIONS 1-9: 7
SUM OF ALL RESPONSES TO PHQ9 QUESTIONS 1 & 2: 3
2. FEELING DOWN, DEPRESSED OR HOPELESS: 3
5. POOR APPETITE OR OVEREATING: 0
SUM OF ALL RESPONSES TO PHQ QUESTIONS 1-9: 7
7. TROUBLE CONCENTRATING ON THINGS, SUCH AS READING THE NEWSPAPER OR WATCHING TELEVISION: 0
4. FEELING TIRED OR HAVING LITTLE ENERGY: 2
10. IF YOU CHECKED OFF ANY PROBLEMS, HOW DIFFICULT HAVE THESE PROBLEMS MADE IT FOR YOU TO DO YOUR WORK, TAKE CARE OF THINGS AT HOME, OR GET ALONG WITH OTHER PEOPLE: 1
SUM OF ALL RESPONSES TO PHQ QUESTIONS 1-9: 7
9. THOUGHTS THAT YOU WOULD BE BETTER OFF DEAD, OR OF HURTING YOURSELF: 0
1. LITTLE INTEREST OR PLEASURE IN DOING THINGS: 0
8. MOVING OR SPEAKING SO SLOWLY THAT OTHER PEOPLE COULD HAVE NOTICED. OR THE OPPOSITE, BEING SO FIGETY OR RESTLESS THAT YOU HAVE BEEN MOVING AROUND A LOT MORE THAN USUAL: 0
6. FEELING BAD ABOUT YOURSELF - OR THAT YOU ARE A FAILURE OR HAVE LET YOURSELF OR YOUR FAMILY DOWN: 0

## 2023-09-25 NOTE — PROGRESS NOTES
Alex Enrique is a 43 y.o. female and presents with Vaginal Discharge (White )       Assessment/Plan:    1. Allergic dermatitis  Comments:  s/p waxing. 2. Bacterial vaginosis  -     metroNIDAZOLE (FLAGYL) 500 MG tablet; Take 1 tablet by mouth 2 times daily for 7 days, Disp-14 tablet, R-0Normal  3. Yeast infection  -     fluconazole (DIFLUCAN) 150 MG tablet; Take 1 tablet by mouth once for 1 dose, Disp-1 tablet, R-0Normal  4. Boil, groin  -     clindamycin (CLEOCIN-T) 1 % gel; Apply topically 2 times daily. , Disp-30 g, R-2, Normal         Follow up and disposition:   No follow-ups on file. Subjective:    Labs obtained prior to visit? No  Reviewed with patient? N/A    [facility] 9-1-2023      ROS:     Review of Systems   Constitutional: Negative. Negative for chills, fatigue and fever. HENT: Negative. Respiratory:  Negative for chest tightness, shortness of breath, wheezing and stridor. Cardiovascular: Negative. Negative for chest pain, palpitations and leg swelling. Gastrointestinal: Negative. Negative for abdominal pain, blood in stool, constipation and nausea. Genitourinary:  Positive for vaginal discharge. Negative for decreased urine volume, dysuria, pelvic pain, urgency and vaginal bleeding. Musculoskeletal: Negative. Skin:  Positive for rash. Neurological: Negative. Negative for dizziness and headaches. Psychiatric/Behavioral: Negative. The problem list was updated as a part of today's visit. 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       SH:  Social History     Tobacco Use    Smoking status:

## 2023-09-25 NOTE — PROGRESS NOTES
Patient states her last menstrual cycle was 9/1. Patient declined shots today. Elías Clarke presents today for   Chief Complaint   Patient presents with    Vaginal Discharge     White        Is someone accompanying this pt? No     Is the patient using any DME equipment during OV? No     Depression Screening:       No data to display                Learning Assessment:  No flowsheet data found. Health Maintenance reviewed and discussed and ordered per Provider. Health Maintenance Due   Topic Date Due    Hepatitis B vaccine (1 of 3 - 3-dose series) Never done    COVID-19 Vaccine (1) Never done    Cervical cancer screen  03/20/2023    Flu vaccine (1) 08/01/2023   . Coordination of Care:  1. Have you been to the ER, urgent care clinic since your last visit? Hospitalized since your last visit? No     2. Have you seen or consulted any other health care providers outside of the [address] [place] [address] since your last visit? Include any pap smears or colon screening. No    3. For patients aged 43-73: Has the patient had a colonoscopy / FIT/ Cologuard? N/a      If the patient is female:    4. For patients aged 43-66: Has the patient had a mammogram within the past 2 years? N/a      5. For patients aged 21-65: Has the patient had a pap smear?  No

## 2023-09-29 ASSESSMENT — ENCOUNTER SYMPTOMS
NAUSEA: 0
CHEST TIGHTNESS: 0
SHORTNESS OF BREATH: 0
CONSTIPATION: 0
GASTROINTESTINAL NEGATIVE: 1
STRIDOR: 0
WHEEZING: 0
BLOOD IN STOOL: 0
ABDOMINAL PAIN: 0

## 2023-10-10 ENCOUNTER — OFFICE VISIT (OUTPATIENT)
Facility: CLINIC | Age: 38
End: 2023-10-10
Payer: COMMERCIAL

## 2023-10-10 VITALS
RESPIRATION RATE: 16 BRPM | TEMPERATURE: 98.2 F | BODY MASS INDEX: 34.05 KG/M2 | WEIGHT: 192.2 LBS | SYSTOLIC BLOOD PRESSURE: 121 MMHG | OXYGEN SATURATION: 97 % | HEIGHT: 63 IN | HEART RATE: 68 BPM | DIASTOLIC BLOOD PRESSURE: 71 MMHG

## 2023-10-10 DIAGNOSIS — R10.2 PELVIC PAIN: ICD-10-CM

## 2023-10-10 DIAGNOSIS — Z11.3 SCREENING EXAMINATION FOR STD (SEXUALLY TRANSMITTED DISEASE): ICD-10-CM

## 2023-10-10 DIAGNOSIS — Z80.41 FAMILY HISTORY OF OVARIAN CANCER: ICD-10-CM

## 2023-10-10 DIAGNOSIS — Z12.4 ENCOUNTER FOR PAPANICOLAOU SMEAR FOR CERVICAL CANCER SCREENING: Primary | ICD-10-CM

## 2023-10-10 PROCEDURE — 99213 OFFICE O/P EST LOW 20 MIN: CPT | Performed by: NURSE PRACTITIONER

## 2023-10-10 PROCEDURE — 3078F DIAST BP <80 MM HG: CPT | Performed by: NURSE PRACTITIONER

## 2023-10-10 PROCEDURE — 3074F SYST BP LT 130 MM HG: CPT | Performed by: NURSE PRACTITIONER

## 2023-10-10 RX ORDER — FLUCONAZOLE 150 MG/1
150 TABLET ORAL ONCE
COMMUNITY
Start: 2023-09-27

## 2023-10-10 ASSESSMENT — ENCOUNTER SYMPTOMS
SHORTNESS OF BREATH: 0
STRIDOR: 0
ABDOMINAL PAIN: 0
BLOOD IN STOOL: 0
WHEEZING: 0
CONSTIPATION: 0
CHEST TIGHTNESS: 0
NAUSEA: 0
GASTROINTESTINAL NEGATIVE: 1

## 2023-10-10 ASSESSMENT — PATIENT HEALTH QUESTIONNAIRE - PHQ9
SUM OF ALL RESPONSES TO PHQ9 QUESTIONS 1 & 2: 2
1. LITTLE INTEREST OR PLEASURE IN DOING THINGS: 1
SUM OF ALL RESPONSES TO PHQ QUESTIONS 1-9: 2
SUM OF ALL RESPONSES TO PHQ QUESTIONS 1-9: 2
2. FEELING DOWN, DEPRESSED OR HOPELESS: 1
SUM OF ALL RESPONSES TO PHQ QUESTIONS 1-9: 2
SUM OF ALL RESPONSES TO PHQ QUESTIONS 1-9: 2

## 2023-10-10 NOTE — PROGRESS NOTES
Samantha Santamaria presents today for   Chief Complaint   Patient presents with    Gynecologic Exam       Is someone accompanying this pt? no    Is the patient using any DME equipment during OV? no    Depression Screening:       No data to display                Learning Assessment:  No flowsheet data found. Health Maintenance reviewed and discussed and ordered per Provider. Health Maintenance Due   Topic Date Due    Hepatitis B vaccine (1 of 3 - 3-dose series) Never done    COVID-19 Vaccine (1) Never done    Cervical cancer screen  03/20/2023    Flu vaccine (1) 08/01/2023   . Coordination of Care:  1. Have you been to the ER, urgent care clinic since your last visit? Hospitalized since your last visit? no    2. Have you seen or consulted any other health care providers outside of the [address] [place] [address] since your last visit? Include any pap smears or colon screening. no    3. For patients aged 43-73: Has the patient had a colonoscopy / FIT/ Cologuard? N/a      If the patient is female:    4. For patients aged 43-66: Has the patient had a mammogram within the past 2 years? N/a      5. For patients aged 21-65: Has the patient had a pap smear?  Patient will get this done in office

## 2023-10-10 NOTE — PROGRESS NOTES
Samantha Santamaria is a 43 y.o. female and presents with Gynecologic Exam       Assessment/Plan:    1. Encounter for Papanicolaou smear for cervical cancer screening  -     PAP IG, Aptima HPV and rfx 16/18,45 ([id number]); Future  2. Family history of ovarian cancer  -     US PELVIS COMPLETE; Future  3. Pelvic pain  -     US PELVIS COMPLETE; Future  4. Screening examination for STD (sexually transmitted disease)  -     Candida Vaginitis and Trichomonas Vaginalis Amplification; Future  -     C.trachomatis N.gonorrhoeae DNA; Future  -     Bacterial Vaginosis Panel; Future         Follow up and disposition:   Return in about 4 weeks (around 11/7/2023) for ultrasound review. Subjective:    Labs obtained prior to visit? No  Reviewed with patient? N/A    [facility] 10-3-2023  Last PAP 2020  No history of abnormal PAP  Possible history of HPV in the past    G 5 P 3 A 2 L 3  No family history of breast CA  Maternal grandmother-ovarian cancer and mother GYN CA (unsure of type)    No GYN concerns; discharge improved  No  concerns  No breast concerns       ROS:     Review of Systems   Constitutional: Negative. Negative for chills, fatigue and fever. HENT: Negative. Respiratory:  Negative for chest tightness, shortness of breath, wheezing and stridor. Cardiovascular: Negative. Negative for chest pain, palpitations and leg swelling. Gastrointestinal: Negative. Negative for abdominal pain, blood in stool, constipation and nausea. Genitourinary:  Positive for pelvic pain and vaginal discharge. Negative for decreased urine volume, dysuria, urgency, vaginal bleeding and vaginal pain. Finishing flagyl medication   Musculoskeletal: Negative. Skin:  Negative for rash. Neurological: Negative. Negative for dizziness and headaches. Psychiatric/Behavioral: Negative. The problem list was updated as a part of today's visit.   Patient Active Problem List   Diagnosis    Genital HSV    Severe obesity ([address])

## 2023-10-13 LAB
BACTERIAL VAGINOSIS, NAA: NEGATIVE
C TRACH DNA SPEC NAA+PROBE: NEGATIVE
CANDIDA GLABRATA, NAA: NEGATIVE
CANDIDA SPECIES, NAA: NEGATIVE
HERPES 1 (THINPREP / APTIMA SWAB): NEGATIVE
HERPES 2 (THINPREP / APTIMA SWAB): NEGATIVE
NEISSERIA GONORRHOEAE, NAA: NEGATIVE
TRICHOMONAS VAGINALIS BY NAA: NEGATIVE

## 2023-10-20 ENCOUNTER — HOSPITAL ENCOUNTER (OUTPATIENT)
Facility: HOSPITAL | Age: 38
Discharge: HOME | End: 2023-10-20
Payer: COMMERCIAL

## 2023-10-20 DIAGNOSIS — Z80.41 FAMILY HISTORY OF OVARIAN CANCER: ICD-10-CM

## 2023-10-20 DIAGNOSIS — R10.2 PELVIC PAIN: ICD-10-CM

## 2023-10-20 LAB
HPV DNA HIGH RISK: NEGATIVE
HPV GENOTYPE: NEGATIVE
HPV, GENOTYPE 18: NEGATIVE
PAP IMAGE GUIDED: NORMAL

## 2023-10-20 PROCEDURE — 76830 TRANSVAGINAL US NON-OB: CPT

## 2023-10-23 DIAGNOSIS — I10 ESSENTIAL (PRIMARY) HYPERTENSION: ICD-10-CM

## 2023-10-23 RX ORDER — LISINOPRIL AND HYDROCHLOROTHIAZIDE 12.5; 1 MG/1; MG/1
TABLET ORAL
Qty: 90 TABLET | Refills: 0 | Status: SHIPPED | OUTPATIENT
Start: 2023-10-23

## 2023-10-28 DIAGNOSIS — I10 ESSENTIAL (PRIMARY) HYPERTENSION: ICD-10-CM

## 2023-10-30 RX ORDER — LISINOPRIL AND HYDROCHLOROTHIAZIDE 12.5; 1 MG/1; MG/1
1 TABLET ORAL DAILY
Qty: 90 TABLET | Refills: 0 | Status: SHIPPED | OUTPATIENT
Start: 2023-10-30

## 2023-11-09 PROBLEM — Z12.4 ENCOUNTER FOR PAPANICOLAOU SMEAR FOR CERVICAL CANCER SCREENING: Status: RESOLVED | Noted: 2023-10-10 | Resolved: 2023-11-09

## 2023-11-10 ENCOUNTER — OFFICE VISIT (OUTPATIENT)
Facility: CLINIC | Age: 38
End: 2023-11-10
Payer: COMMERCIAL

## 2023-11-10 VITALS
TEMPERATURE: 98 F | HEART RATE: 75 BPM | OXYGEN SATURATION: 96 % | HEIGHT: 63 IN | BODY MASS INDEX: 34.38 KG/M2 | WEIGHT: 194 LBS | SYSTOLIC BLOOD PRESSURE: 122 MMHG | DIASTOLIC BLOOD PRESSURE: 77 MMHG | RESPIRATION RATE: 16 BRPM

## 2023-11-10 DIAGNOSIS — Z80.41 FAMILY HISTORY OF OVARIAN CANCER: ICD-10-CM

## 2023-11-10 DIAGNOSIS — Z90.49 S/P LAPAROSCOPIC CHOLECYSTECTOMY: ICD-10-CM

## 2023-11-10 DIAGNOSIS — I10 ESSENTIAL (PRIMARY) HYPERTENSION: ICD-10-CM

## 2023-11-10 DIAGNOSIS — N83.202 CYST OF LEFT OVARY: ICD-10-CM

## 2023-11-10 DIAGNOSIS — R10.2 PELVIC PAIN: Primary | ICD-10-CM

## 2023-11-10 PROBLEM — R79.89 ELEVATED LFTS: Status: RESOLVED | Noted: 2023-06-05 | Resolved: 2023-11-10

## 2023-11-10 PROBLEM — B37.9 YEAST INFECTION: Status: RESOLVED | Noted: 2023-06-05 | Resolved: 2023-11-10

## 2023-11-10 PROBLEM — K85.90 ACUTE PANCREATITIS: Status: RESOLVED | Noted: 2023-03-04 | Resolved: 2023-11-10

## 2023-11-10 PROCEDURE — 3074F SYST BP LT 130 MM HG: CPT | Performed by: NURSE PRACTITIONER

## 2023-11-10 PROCEDURE — 99212 OFFICE O/P EST SF 10 MIN: CPT | Performed by: NURSE PRACTITIONER

## 2023-11-10 PROCEDURE — 3078F DIAST BP <80 MM HG: CPT | Performed by: NURSE PRACTITIONER

## 2023-11-10 ASSESSMENT — PATIENT HEALTH QUESTIONNAIRE - PHQ9
SUM OF ALL RESPONSES TO PHQ QUESTIONS 1-9: 0
SUM OF ALL RESPONSES TO PHQ9 QUESTIONS 1 & 2: 0
SUM OF ALL RESPONSES TO PHQ QUESTIONS 1-9: 0
2. FEELING DOWN, DEPRESSED OR HOPELESS: 0
1. LITTLE INTEREST OR PLEASURE IN DOING THINGS: 0

## 2023-11-10 ASSESSMENT — ENCOUNTER SYMPTOMS
STRIDOR: 0
NAUSEA: 0
BLOOD IN STOOL: 0
WHEEZING: 0
CHEST TIGHTNESS: 0
SHORTNESS OF BREATH: 0
ABDOMINAL PAIN: 0
CONSTIPATION: 0
GASTROINTESTINAL NEGATIVE: 1

## 2023-11-10 NOTE — PROGRESS NOTES
Fadi Batres is a 43 y.o. female and presents with Follow-up and Ultrasound       Assessment/Plan:    1. Pelvic pain  Comments:  vaginal ultrasound shows simple cyst left ovary; no further pelvic pain  2. S/P laparoscopic cholecystectomy  3. Family history of ovarian cancer  4. Cyst of left ovary  5. Essential (primary) hypertension  Comments:  controlled on prinzide     Follow up and disposition:   Return in about 4 months (around 3/10/2024). Subjective:    Labs obtained prior to visit? No  Reviewed with patient? N/A    [facility] 11-2-2023  Reviewed vaginal ultrasound showing 2.5 cm simple cyst left ovary     ROS:     Review of Systems   Constitutional: Negative. Negative for chills, fatigue and fever. HENT: Negative. Respiratory:  Negative for chest tightness, shortness of breath, wheezing and stridor. Cardiovascular: Negative. Negative for chest pain, palpitations and leg swelling. Gastrointestinal: Negative. Negative for abdominal pain, blood in stool, constipation and nausea. Genitourinary:  Negative for decreased urine volume, dysuria, pelvic pain, urgency, vaginal bleeding, vaginal discharge and vaginal pain. Finishing flagyl medication   Musculoskeletal: Negative. Skin:  Negative for rash. Neurological: Negative. Negative for dizziness and headaches. Psychiatric/Behavioral: Negative. The problem list was updated as a part of today's visit. 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       SH:  Social History     Tobacco Use    Smoking status: Never    Smokeless tobacco: Never   Substance Use

## 2023-11-10 NOTE — PROGRESS NOTES
Patient last menstrual cycle was November 2. Patient declined vaccines today. Jenni Guillory presents today for   Chief Complaint   Patient presents with    Follow-up    Ultrasound       Is someone accompanying this pt? No     Is the patient using any DME equipment during OV? No     Depression Screening:       No data to display                Learning Assessment:  No flowsheet data found. Health Maintenance reviewed and discussed and ordered per Provider. Health Maintenance Due   Topic Date Due    Hepatitis B vaccine (1 of 3 - 3-dose series) Never done    COVID-19 Vaccine (1) Never done    Flu vaccine (1) 08/01/2023   . Coordination of Care:  1. Have you been to the ER, urgent care clinic since your last visit? Hospitalized since your last visit? No     2. Have you seen or consulted any other health care providers outside of the [address] [place] [address] since your last visit? Include any pap smears or colon screening. No    3. For patients aged 43-73: Has the patient had a colonoscopy / FIT/ Cologuard? N/A      If the patient is female:    4. For patients aged 43-66: Has the patient had a mammogram within the past 2 years? N/a      5. For patients aged 21-65: Has the patient had a pap smear?  Yes

## 2024-02-07 DIAGNOSIS — N76.0 BACTERIAL VAGINOSIS: ICD-10-CM

## 2024-02-07 DIAGNOSIS — B96.89 BACTERIAL VAGINOSIS: ICD-10-CM

## 2024-02-07 RX ORDER — METRONIDAZOLE 500 MG/1
500 TABLET ORAL 2 TIMES DAILY
Qty: 14 TABLET | Refills: 0 | Status: SHIPPED | OUTPATIENT
Start: 2024-02-07 | End: 2024-02-14

## 2024-04-19 DIAGNOSIS — I10 ESSENTIAL (PRIMARY) HYPERTENSION: ICD-10-CM

## 2024-04-19 RX ORDER — LISINOPRIL AND HYDROCHLOROTHIAZIDE 12.5; 1 MG/1; MG/1
1 TABLET ORAL DAILY
Qty: 90 TABLET | Refills: 0 | Status: SHIPPED | OUTPATIENT
Start: 2024-04-19

## 2024-07-24 ENCOUNTER — OFFICE VISIT (OUTPATIENT)
Facility: CLINIC | Age: 39
End: 2024-07-24

## 2024-07-24 VITALS
OXYGEN SATURATION: 98 % | DIASTOLIC BLOOD PRESSURE: 84 MMHG | TEMPERATURE: 98.8 F | WEIGHT: 214.8 LBS | RESPIRATION RATE: 16 BRPM | HEART RATE: 76 BPM | SYSTOLIC BLOOD PRESSURE: 136 MMHG | HEIGHT: 63 IN | BODY MASS INDEX: 38.06 KG/M2

## 2024-07-24 DIAGNOSIS — J40 BRONCHITIS: ICD-10-CM

## 2024-07-24 DIAGNOSIS — I10 ESSENTIAL (PRIMARY) HYPERTENSION: ICD-10-CM

## 2024-07-24 DIAGNOSIS — B37.9 YEAST INFECTION: ICD-10-CM

## 2024-07-24 DIAGNOSIS — F41.9 ANXIETY: ICD-10-CM

## 2024-07-24 DIAGNOSIS — Z13.31 POSITIVE DEPRESSION SCREENING: Primary | ICD-10-CM

## 2024-07-24 DIAGNOSIS — F32.A DEPRESSION, UNSPECIFIED DEPRESSION TYPE: ICD-10-CM

## 2024-07-24 DIAGNOSIS — Z12.31 BREAST CANCER SCREENING BY MAMMOGRAM: ICD-10-CM

## 2024-07-24 DIAGNOSIS — Z77.120 MOLD SUSPECTED EXPOSURE: ICD-10-CM

## 2024-07-24 PROCEDURE — 3075F SYST BP GE 130 - 139MM HG: CPT | Performed by: NURSE PRACTITIONER

## 2024-07-24 PROCEDURE — 3079F DIAST BP 80-89 MM HG: CPT | Performed by: NURSE PRACTITIONER

## 2024-07-24 PROCEDURE — 99214 OFFICE O/P EST MOD 30 MIN: CPT | Performed by: NURSE PRACTITIONER

## 2024-07-24 RX ORDER — BUPROPION HYDROCHLORIDE 150 MG/1
150 TABLET, EXTENDED RELEASE ORAL DAILY
COMMUNITY
Start: 2024-07-23

## 2024-07-24 RX ORDER — ESCITALOPRAM OXALATE 10 MG/1
10 TABLET ORAL DAILY
COMMUNITY
Start: 2024-07-23

## 2024-07-24 RX ORDER — DOXYCYCLINE HYCLATE 100 MG
100 TABLET ORAL 2 TIMES DAILY
Qty: 14 TABLET | Refills: 0 | Status: SHIPPED | OUTPATIENT
Start: 2024-07-24 | End: 2024-07-31

## 2024-07-24 RX ORDER — METHYLPREDNISOLONE 4 MG/1
TABLET ORAL
Qty: 1 KIT | Refills: 0 | Status: SHIPPED | OUTPATIENT
Start: 2024-07-24 | End: 2024-07-30

## 2024-07-24 ASSESSMENT — PATIENT HEALTH QUESTIONNAIRE - PHQ9
3. TROUBLE FALLING OR STAYING ASLEEP: NEARLY EVERY DAY
SUM OF ALL RESPONSES TO PHQ QUESTIONS 1-9: 15
5. POOR APPETITE OR OVEREATING: NOT AT ALL
4. FEELING TIRED OR HAVING LITTLE ENERGY: NEARLY EVERY DAY
SUM OF ALL RESPONSES TO PHQ QUESTIONS 1-9: 15
1. LITTLE INTEREST OR PLEASURE IN DOING THINGS: NEARLY EVERY DAY
SUM OF ALL RESPONSES TO PHQ QUESTIONS 1-9: 15
2. FEELING DOWN, DEPRESSED OR HOPELESS: NEARLY EVERY DAY
8. MOVING OR SPEAKING SO SLOWLY THAT OTHER PEOPLE COULD HAVE NOTICED. OR THE OPPOSITE, BEING SO FIGETY OR RESTLESS THAT YOU HAVE BEEN MOVING AROUND A LOT MORE THAN USUAL: SEVERAL DAYS
10. IF YOU CHECKED OFF ANY PROBLEMS, HOW DIFFICULT HAVE THESE PROBLEMS MADE IT FOR YOU TO DO YOUR WORK, TAKE CARE OF THINGS AT HOME, OR GET ALONG WITH OTHER PEOPLE: SOMEWHAT DIFFICULT
7. TROUBLE CONCENTRATING ON THINGS, SUCH AS READING THE NEWSPAPER OR WATCHING TELEVISION: SEVERAL DAYS
SUM OF ALL RESPONSES TO PHQ9 QUESTIONS 1 & 2: 6
6. FEELING BAD ABOUT YOURSELF - OR THAT YOU ARE A FAILURE OR HAVE LET YOURSELF OR YOUR FAMILY DOWN: SEVERAL DAYS
9. THOUGHTS THAT YOU WOULD BE BETTER OFF DEAD, OR OF HURTING YOURSELF: NOT AT ALL
SUM OF ALL RESPONSES TO PHQ QUESTIONS 1-9: 15

## 2024-07-24 ASSESSMENT — LIFESTYLE VARIABLES
HOW OFTEN DO YOU HAVE A DRINK CONTAINING ALCOHOL: 2-4 TIMES A MONTH
HOW MANY STANDARD DRINKS CONTAINING ALCOHOL DO YOU HAVE ON A TYPICAL DAY: 1 OR 2

## 2024-07-24 NOTE — PROGRESS NOTES
\"Have you been to the ER, urgent care clinic since your last visit?  Hospitalized since your last visit?\"    NO    “Have you seen or consulted any other health care providers outside of [facility] since your last visit?”    NO    Have you had a mammogram?”   NO    No breast cancer screening on file             Click Here for Release of Records Request    
  HENT: Negative.     Respiratory:  Positive for cough. Negative for chest tightness, shortness of breath, wheezing and stridor.    Cardiovascular: Negative.  Negative for chest pain, palpitations and leg swelling.   Gastrointestinal: Negative.  Negative for abdominal pain, blood in stool, constipation and nausea.   Genitourinary:  Negative for decreased urine volume, dysuria, pelvic pain, urgency, vaginal bleeding, vaginal discharge and vaginal pain.   Musculoskeletal: Negative.    Skin:  Positive for rash.   Neurological: Negative.  Negative for dizziness and headaches.   Psychiatric/Behavioral: Negative.           The problem list was updated as a part of today's visit.  Patient Active Problem List   Diagnosis    Severe obesity (HCC)    Essential (primary) hypertension    Depression    Anxiety    S/P laparoscopic cholecystectomy    History of pancreatitis    Allergic dermatitis    Family history of ovarian cancer    Cyst of left ovary    Mold suspected exposure    Positive depression screening    Yeast infection       The PSH, FH were reviewed.      SH:  Social History     Tobacco Use    Smoking status: Never    Smokeless tobacco: Never   Substance Use Topics    Alcohol use: Yes    Drug use: No       Medications/Allergies:  Current Outpatient Medications on File Prior to Visit   Medication Sig Dispense Refill    buPROPion (WELLBUTRIN SR) 150 MG extended release tablet Take 1 tablet by mouth daily      escitalopram (LEXAPRO) 10 MG tablet Take 1 tablet by mouth daily      lisinopril-hydroCHLOROthiazide (PRINZIDE;ZESTORETIC) 10-12.5 MG per tablet take 1 tablet by mouth once daily 90 tablet 0     No current facility-administered medications on file prior to visit.        No Known Allergies    Objective:  /84   Pulse 76   Temp 98.8 °F (37.1 °C) (Temporal)   Resp 16   Ht 1.6 m (5' 3\")   Wt 97.4 kg (214 lb 12.8 oz)   LMP 07/14/2024   SpO2 98%   BMI 38.05 kg/m²  Body mass index is 38.05 kg/m².    Physical

## 2024-08-05 PROBLEM — H61.23 BILATERAL IMPACTED CERUMEN: Status: RESOLVED | Noted: 2023-01-05 | Resolved: 2024-08-05

## 2024-08-05 PROBLEM — Q35.1: Status: RESOLVED | Noted: 2023-06-05 | Resolved: 2024-08-05

## 2024-08-05 PROBLEM — B96.89 BACTERIAL VAGINOSIS: Status: RESOLVED | Noted: 2023-09-25 | Resolved: 2024-08-05

## 2024-08-05 PROBLEM — R10.2 PELVIC PAIN: Status: RESOLVED | Noted: 2023-11-10 | Resolved: 2024-08-05

## 2024-08-05 PROBLEM — N76.0 BACTERIAL VAGINOSIS: Status: RESOLVED | Noted: 2023-09-25 | Resolved: 2024-08-05

## 2024-08-05 PROBLEM — J40 BRONCHITIS: Status: ACTIVE | Noted: 2024-08-05

## 2024-08-05 ASSESSMENT — ENCOUNTER SYMPTOMS
WHEEZING: 0
NAUSEA: 0
ABDOMINAL PAIN: 0
BLOOD IN STOOL: 0
CHEST TIGHTNESS: 0
GASTROINTESTINAL NEGATIVE: 1
STRIDOR: 0
SHORTNESS OF BREATH: 0
COUGH: 1
CONSTIPATION: 0

## 2024-08-12 DIAGNOSIS — I10 ESSENTIAL (PRIMARY) HYPERTENSION: ICD-10-CM

## 2024-08-13 RX ORDER — LISINOPRIL/HYDROCHLOROTHIAZIDE 10-12.5 MG
1 TABLET ORAL DAILY
Qty: 90 TABLET | Refills: 0 | OUTPATIENT
Start: 2024-08-13

## 2024-08-17 DIAGNOSIS — I10 ESSENTIAL (PRIMARY) HYPERTENSION: ICD-10-CM

## 2024-08-19 RX ORDER — LISINOPRIL AND HYDROCHLOROTHIAZIDE 12.5; 1 MG/1; MG/1
1 TABLET ORAL DAILY
Qty: 90 TABLET | Refills: 0 | Status: SHIPPED | OUTPATIENT
Start: 2024-08-19

## 2024-09-02 DIAGNOSIS — J40 BRONCHITIS: ICD-10-CM

## 2024-09-03 RX ORDER — IPRATROPIUM BROMIDE AND ALBUTEROL 20; 100 UG/1; UG/1
1 SPRAY, METERED RESPIRATORY (INHALATION) EVERY 6 HOURS PRN
Qty: 4 G | Refills: 0 | Status: SHIPPED | OUTPATIENT
Start: 2024-09-03

## 2024-11-16 DIAGNOSIS — I10 ESSENTIAL (PRIMARY) HYPERTENSION: ICD-10-CM

## 2024-11-18 RX ORDER — LISINOPRIL AND HYDROCHLOROTHIAZIDE 10; 12.5 MG/1; MG/1
1 TABLET ORAL DAILY
Qty: 90 TABLET | Refills: 0 | OUTPATIENT
Start: 2024-11-18

## 2024-11-26 DIAGNOSIS — I10 ESSENTIAL (PRIMARY) HYPERTENSION: ICD-10-CM

## 2024-11-26 RX ORDER — LISINOPRIL AND HYDROCHLOROTHIAZIDE 10; 12.5 MG/1; MG/1
1 TABLET ORAL DAILY
Qty: 90 TABLET | Refills: 0 | Status: SHIPPED | OUTPATIENT
Start: 2024-11-26

## 2024-11-27 ENCOUNTER — OFFICE VISIT (OUTPATIENT)
Facility: CLINIC | Age: 39
End: 2024-11-27
Payer: COMMERCIAL

## 2024-11-27 VITALS
WEIGHT: 217 LBS | BODY MASS INDEX: 38.45 KG/M2 | DIASTOLIC BLOOD PRESSURE: 80 MMHG | HEART RATE: 70 BPM | HEIGHT: 63 IN | OXYGEN SATURATION: 98 % | TEMPERATURE: 98.4 F | SYSTOLIC BLOOD PRESSURE: 128 MMHG | RESPIRATION RATE: 16 BRPM

## 2024-11-27 DIAGNOSIS — I10 ESSENTIAL (PRIMARY) HYPERTENSION: ICD-10-CM

## 2024-11-27 DIAGNOSIS — J30.2 SEASONAL ALLERGIES: ICD-10-CM

## 2024-11-27 DIAGNOSIS — R82.998 LEUKOCYTES IN URINE: ICD-10-CM

## 2024-11-27 DIAGNOSIS — T36.95XA ANTIBIOTIC-INDUCED YEAST INFECTION: ICD-10-CM

## 2024-11-27 DIAGNOSIS — F32.A DEPRESSION, UNSPECIFIED DEPRESSION TYPE: ICD-10-CM

## 2024-11-27 DIAGNOSIS — B37.9 ANTIBIOTIC-INDUCED YEAST INFECTION: ICD-10-CM

## 2024-11-27 DIAGNOSIS — R35.0 URINE FREQUENCY: Primary | ICD-10-CM

## 2024-11-27 LAB
BILIRUBIN, URINE, POC: NEGATIVE
BLOOD URINE, POC: NORMAL
GLUCOSE URINE, POC: NEGATIVE
KETONES, URINE, POC: NEGATIVE
LEUKOCYTE ESTERASE, URINE, POC: NORMAL
NITRITE, URINE, POC: NEGATIVE
PH, URINE, POC: 5.5 (ref 4.6–8)
PROTEIN,URINE, POC: NEGATIVE
SPECIFIC GRAVITY, URINE, POC: 1.01 (ref 1–1.03)
URINALYSIS CLARITY, POC: CLEAR
URINALYSIS COLOR, POC: YELLOW
UROBILINOGEN, POC: NORMAL MG/DL

## 2024-11-27 PROCEDURE — 99214 OFFICE O/P EST MOD 30 MIN: CPT | Performed by: NURSE PRACTITIONER

## 2024-11-27 PROCEDURE — 3074F SYST BP LT 130 MM HG: CPT | Performed by: NURSE PRACTITIONER

## 2024-11-27 PROCEDURE — 3079F DIAST BP 80-89 MM HG: CPT | Performed by: NURSE PRACTITIONER

## 2024-11-27 PROCEDURE — 81001 URINALYSIS AUTO W/SCOPE: CPT | Performed by: NURSE PRACTITIONER

## 2024-11-27 RX ORDER — CETIRIZINE HYDROCHLORIDE 10 MG/1
10 TABLET ORAL DAILY
Qty: 90 TABLET | Refills: 0 | Status: SHIPPED | OUTPATIENT
Start: 2024-11-27

## 2024-11-27 ASSESSMENT — PATIENT HEALTH QUESTIONNAIRE - PHQ9
SUM OF ALL RESPONSES TO PHQ9 QUESTIONS 1 & 2: 0
SUM OF ALL RESPONSES TO PHQ QUESTIONS 1-9: 0
2. FEELING DOWN, DEPRESSED OR HOPELESS: NOT AT ALL
1. LITTLE INTEREST OR PLEASURE IN DOING THINGS: NOT AT ALL
SUM OF ALL RESPONSES TO PHQ QUESTIONS 1-9: 0

## 2024-11-27 NOTE — PROGRESS NOTES
Patient last menstrual cycle was 11/11.     Elizabeth Livingston presents today for   Chief Complaint   Patient presents with    Follow-up       Is someone accompanying this pt? no    Is the patient using any DME equipment during OV? No     Depression Screening:       No data to display                Learning Assessment:  Failed to redirect to the Timeline version of the [employer] SmartLink.    Health Maintenance reviewed and discussed and ordered per Provider.    Health Maintenance Due   Topic Date Due    Hepatitis B vaccine (1 of 3 - 19+ 3-dose series) Never done    Breast cancer screen  Never done    Flu vaccine (1) 08/01/2024    COVID-19 Vaccine (1 - 2023-24 season) Never done     \"Have you been to the ER, urgent care clinic since your last visit?  Hospitalized since your last visit?\"    no    “Have you seen or consulted any other health care providers outside our system since your last visit?”    No     Have you had a mammogram?”   Patient has to reschedule     No breast cancer screening on file

## 2024-11-27 NOTE — PROGRESS NOTES
[address] [place] [address]               [phone]      Elizabeth Livingston is a 39 y.o. female and presents with Follow-up and Vaginal Odor (Started this week, peeing a lot often, clear, white discharge )       Assessment/Plan:    1. Urine frequency  -     AMB POC URINALYSIS DIP STICK AUTO W/ MICRO  -     Culture, Urine; Future  2. Seasonal allergies  -     cetirizine (ZYRTEC) 10 MG tablet; Take 1 tablet by mouth daily, Disp-90 tablet, R-0Normal  3. Essential (primary) hypertension  4. Depression, unspecified depression type  5. Leukocytes in urine  -     Culture, Urine; Future  -     nitrofurantoin, macrocrystal-monohydrate, (MACROBID) 100 MG capsule; Take 1 capsule by mouth 2 times daily for 10 days, Disp-20 capsule, R-0Normal  -     phenazopyridine (PYRIDIUM) 100 MG tablet; Take 1 tablet by mouth 3 times daily as needed for Pain, Disp-9 tablet, R-0Normal  6. Antibiotic-induced yeast infection  -     fluconazole (DIFLUCAN) 150 MG tablet; Take 1 tablet by mouth once for 1 dose, Disp-1 tablet, R-0Normal         Follow up and disposition:   Return in about 4 months (around 3/27/2025).      Subjective:    Labs obtained prior to visit? No  Reviewed with patient? N/A    [facility] 11-    Recently stopped lexapro and wellbutrin due to weight gain      ROS:     Review of Systems   Constitutional: Negative.  Negative for chills, fatigue and fever.   HENT: Negative.     Respiratory:  Negative for cough, chest tightness, shortness of breath, wheezing and stridor.    Cardiovascular: Negative.  Negative for chest pain, palpitations and leg swelling.   Gastrointestinal: Negative.  Negative for abdominal pain, blood in stool, constipation and nausea.   Genitourinary:  Positive for frequency. Negative for decreased urine volume, dysuria, pelvic pain, urgency, vaginal bleeding, vaginal discharge and vaginal pain.   Musculoskeletal: Negative.    Skin:  Positive for rash.   Neurological:

## 2024-11-29 RX ORDER — FLUCONAZOLE 150 MG/1
150 TABLET ORAL ONCE
Qty: 1 TABLET | Refills: 0 | Status: SHIPPED | OUTPATIENT
Start: 2024-11-29 | End: 2024-11-29

## 2024-11-29 RX ORDER — PHENAZOPYRIDINE HYDROCHLORIDE 100 MG/1
100 TABLET, FILM COATED ORAL 3 TIMES DAILY PRN
Qty: 9 TABLET | Refills: 0 | Status: SHIPPED | OUTPATIENT
Start: 2024-11-29 | End: 2024-12-02

## 2024-11-29 RX ORDER — NITROFURANTOIN 25; 75 MG/1; MG/1
100 CAPSULE ORAL 2 TIMES DAILY
Qty: 20 CAPSULE | Refills: 0 | Status: SHIPPED | OUTPATIENT
Start: 2024-11-29 | End: 2024-12-09

## 2024-11-29 SDOH — ECONOMIC STABILITY: FOOD INSECURITY: WITHIN THE PAST 12 MONTHS, YOU WORRIED THAT YOUR FOOD WOULD RUN OUT BEFORE YOU GOT MONEY TO BUY MORE.: NEVER TRUE

## 2024-11-29 SDOH — ECONOMIC STABILITY: FOOD INSECURITY: WITHIN THE PAST 12 MONTHS, THE FOOD YOU BOUGHT JUST DIDN'T LAST AND YOU DIDN'T HAVE MONEY TO GET MORE.: NEVER TRUE

## 2024-11-29 SDOH — ECONOMIC STABILITY: INCOME INSECURITY: HOW HARD IS IT FOR YOU TO PAY FOR THE VERY BASICS LIKE FOOD, HOUSING, MEDICAL CARE, AND HEATING?: NOT HARD AT ALL

## 2024-12-05 LAB — RESULT: ABNORMAL

## 2024-12-06 RX ORDER — FLUCONAZOLE 150 MG/1
TABLET ORAL
COMMUNITY
Start: 2024-11-29

## 2024-12-10 ENCOUNTER — TELEMEDICINE (OUTPATIENT)
Facility: CLINIC | Age: 39
End: 2024-12-10

## 2024-12-10 DIAGNOSIS — J11.1 FLU: ICD-10-CM

## 2024-12-10 DIAGNOSIS — J01.90 ACUTE NON-RECURRENT SINUSITIS, UNSPECIFIED LOCATION: Primary | ICD-10-CM

## 2024-12-10 DIAGNOSIS — J30.2 SEASONAL ALLERGIES: ICD-10-CM

## 2024-12-10 DIAGNOSIS — I10 ESSENTIAL (PRIMARY) HYPERTENSION: ICD-10-CM

## 2024-12-10 RX ORDER — OSELTAMIVIR PHOSPHATE 75 MG/1
75 CAPSULE ORAL 2 TIMES DAILY
Qty: 10 CAPSULE | Refills: 0 | Status: SHIPPED | OUTPATIENT
Start: 2024-12-10 | End: 2024-12-15

## 2024-12-10 NOTE — PROGRESS NOTES
Elizabeth Livingston, was evaluated through a synchronous (real-time) audio-video encounter. The patient (or guardian if applicable) is aware that this is a billable service, which includes applicable co-pays. This Virtual Visit was conducted with patient's (and/or legal guardian's) consent. Patient identification was verified, and a caregiver was present when appropriate.   The patient was located at Home: [address] [place] [id number]  Provider was located at Facility (Appt Dept): [address] [place] [address], [address]  [place] [address]  Confirm you are appropriately licensed, registered, or certified to deliver care in the state where the patient is located as indicated above. If you are not or unsure, please re-schedule the visit: Yes, I confirm.     Elizabeth Livingston (:  1985) is a Established patient, presenting virtually for evaluation of the following:      Below is the assessment and plan developed based on review of pertinent history, physical exam, labs, studies, and medications.     Assessment & Plan  Acute non-recurrent sinusitis, unspecified location    Patient states has been sick since Saturday; malaise and back/neck pain  --patient then states felt better /Monday but today has congestion/headache     --patient has fever/chills and body aches        Essential (primary) hypertension   Chronic, at goal (stable), continue current treatment plan         Seasonal allergies   Chronic, at goal (stable), continue current treatment plan         Flu         Orders:    oseltamivir (TAMIFLU) 75 MG capsule; Take 1 capsule by mouth 2 times daily for 5 days      No follow-ups on file.       Subjective   HPI  Review of Systems       Objective   Patient-Reported Vitals  No data recorded     Physical Exam  [INSTRUCTIONS:  \"[x]\" Indicates a positive item  \"[]\" Indicates a negative item  -- DELETE ALL ITEMS NOT EXAMINED]    Constitutional: [x] Appears well-developed and well-nourished [x] No

## 2024-12-10 NOTE — PROGRESS NOTES
Elizabeth JEREMIAH Livingston presents today for   Chief Complaint   Patient presents with    Sinus Problem     Today she states when she blows her nose it is blood coming out     Back Pain    Neck Pain       Virtual/telephone visit    Depression Screening:       No data to display                Learning Assessment:  Failed to redirect to the Timeline version of the [employer] SmartLink.    Health Maintenance reviewed and discussed and ordered per Provider.    Health Maintenance Due   Topic Date Due    Breast cancer screen  Never done   .      \"Have you been to the ER, urgent care clinic since your last visit?  Hospitalized since your last visit?\"    no    “Have you seen or consulted any other health care providers outside our system since your last visit?”    no    Have you had a mammogram?”   no    No breast cancer screening on file

## 2024-12-10 NOTE — ASSESSMENT & PLAN NOTE
Patient states has been sick since Saturday; malaise and back/neck pain  --patient then states felt better Sunday/Monday but today has congestion/headache     --patient has fever/chills and body aches

## 2025-02-26 DIAGNOSIS — I10 ESSENTIAL (PRIMARY) HYPERTENSION: ICD-10-CM

## 2025-02-26 RX ORDER — LISINOPRIL AND HYDROCHLOROTHIAZIDE 10; 12.5 MG/1; MG/1
1 TABLET ORAL DAILY
Qty: 90 TABLET | Refills: 0 | Status: SHIPPED | OUTPATIENT
Start: 2025-02-26

## 2025-05-08 DIAGNOSIS — J30.2 SEASONAL ALLERGIES: ICD-10-CM

## 2025-05-08 RX ORDER — CETIRIZINE HYDROCHLORIDE 10 MG/1
10 TABLET ORAL DAILY
Qty: 90 TABLET | Refills: 0 | Status: SHIPPED | OUTPATIENT
Start: 2025-05-08

## 2025-05-29 DIAGNOSIS — I10 ESSENTIAL (PRIMARY) HYPERTENSION: ICD-10-CM

## 2025-05-29 RX ORDER — LISINOPRIL AND HYDROCHLOROTHIAZIDE 10; 12.5 MG/1; MG/1
1 TABLET ORAL DAILY
Qty: 90 TABLET | Refills: 0 | Status: SHIPPED | OUTPATIENT
Start: 2025-05-29

## 2025-08-25 DIAGNOSIS — I10 ESSENTIAL (PRIMARY) HYPERTENSION: ICD-10-CM

## 2025-08-25 RX ORDER — LISINOPRIL AND HYDROCHLOROTHIAZIDE 10; 12.5 MG/1; MG/1
1 TABLET ORAL DAILY
Qty: 90 TABLET | Refills: 0 | Status: SHIPPED | OUTPATIENT
Start: 2025-08-25

## 2025-08-26 ENCOUNTER — HOSPITAL ENCOUNTER (OUTPATIENT)
Facility: HOSPITAL | Age: 40
Setting detail: SPECIMEN
Discharge: HOME | End: 2025-08-29

## 2025-08-26 ENCOUNTER — OFFICE VISIT (OUTPATIENT)
Facility: CLINIC | Age: 40
End: 2025-08-26
Payer: COMMERCIAL

## 2025-08-26 VITALS
RESPIRATION RATE: 16 BRPM | TEMPERATURE: 97.7 F | SYSTOLIC BLOOD PRESSURE: 113 MMHG | HEART RATE: 67 BPM | WEIGHT: 207 LBS | OXYGEN SATURATION: 98 % | DIASTOLIC BLOOD PRESSURE: 67 MMHG | HEIGHT: 63 IN | BODY MASS INDEX: 36.68 KG/M2

## 2025-08-26 DIAGNOSIS — B37.9 YEAST INFECTION: ICD-10-CM

## 2025-08-26 DIAGNOSIS — N89.8 VAGINAL DISCHARGE: ICD-10-CM

## 2025-08-26 DIAGNOSIS — Z12.4 ENCOUNTER FOR PAPANICOLAOU SMEAR FOR CERVICAL CANCER SCREENING: ICD-10-CM

## 2025-08-26 DIAGNOSIS — Z80.41 FAMILY HISTORY OF OVARIAN CANCER: ICD-10-CM

## 2025-08-26 DIAGNOSIS — R45.86 MOOD SWINGS: ICD-10-CM

## 2025-08-26 DIAGNOSIS — J30.2 SEASONAL ALLERGIES: ICD-10-CM

## 2025-08-26 DIAGNOSIS — R14.0 BLOATING SYMPTOM: ICD-10-CM

## 2025-08-26 DIAGNOSIS — Z00.00 ANNUAL PHYSICAL EXAM: Primary | ICD-10-CM

## 2025-08-26 DIAGNOSIS — Z12.39 ENCOUNTER FOR BREAST CANCER SCREENING OTHER THAN MAMMOGRAM: ICD-10-CM

## 2025-08-26 DIAGNOSIS — I10 ESSENTIAL (PRIMARY) HYPERTENSION: ICD-10-CM

## 2025-08-26 DIAGNOSIS — Z12.31 BREAST CANCER SCREENING BY MAMMOGRAM: ICD-10-CM

## 2025-08-26 LAB
BACTERIA, URINE: PRESENT
BASOPHILS # BLD: 1 % (ref 0–2)
BASOPHILS ABSOLUTE: 0.1 K/UL (ref 0–0.2)
BILIRUBIN, URINE: NEGATIVE
CLARITY, UA: CLEAR
COLOR, UA: YELLOW
EOSINOPHIL # BLD: 2 % (ref 0–6)
EOSINOPHILS ABSOLUTE: 0.1 K/UL (ref 0–0.5)
GLUCOSE URINE: NEGATIVE MG/DL
HCT VFR BLD CALC: 39.7 % (ref 35.1–46.5)
HEMOGLOBIN: 12.9 G/DL (ref 11.7–15.5)
HYALINE CASTS: ABNORMAL /LPF (ref 0–2)
KETONES, URINE: NEGATIVE MG/DL
LEUKOCYTE ESTERASE, URINE: NEGATIVE
LYMPHOCYTES # BLD: 46 % (ref 20–45)
LYMPHOCYTES ABSOLUTE: 2.4 K/UL (ref 1–4.8)
MCH RBC QN AUTO: 31 PG (ref 26–34)
MCHC RBC AUTO-ENTMCNC: 33 G/DL (ref 31–36)
MCV RBC AUTO: 94 FL (ref 80–99)
MONOCYTES ABSOLUTE: 0.4 K/UL (ref 0.1–1)
MONOCYTES: 8 % (ref 3–12)
NEUTROPHILS ABSOLUTE: 2.4 K/UL (ref 1.8–7.7)
NEUTROPHILS SEGMENTED: 45 % (ref 40–75)
NITRITE, URINE: NEGATIVE
OCCULT BLOOD,URINE: NEGATIVE
PDW BLD-RTO: 12.5 % (ref 10–15.5)
PH, URINE: 7.5 PH (ref 5–8)
PLATELET # BLD: 258 K/UL (ref 140–440)
PMV BLD AUTO: 11.9 FL (ref 9–13)
PROTEIN, URINE: NEGATIVE MG/DL
RBC # BLD: 4.23 M/UL (ref 3.8–5.2)
RBC URINE: ABNORMAL /HPF
SENTARA SPECIMEN COLLECTION: NORMAL
SPECIFIC GRAVITY UA: 1.02 (ref 1–1.03)
SQUAMOUS EPITHELIAL CELLS: ABNORMAL /HPF
UROBILINOGEN, URINE: 1 MG/DL
WBC # BLD: 5.4 K/UL (ref 4–11)
WBC URINE: ABNORMAL /HPF (ref 0–5)

## 2025-08-26 PROCEDURE — 99396 PREV VISIT EST AGE 40-64: CPT | Performed by: NURSE PRACTITIONER

## 2025-08-26 PROCEDURE — 3078F DIAST BP <80 MM HG: CPT | Performed by: NURSE PRACTITIONER

## 2025-08-26 PROCEDURE — 3074F SYST BP LT 130 MM HG: CPT | Performed by: NURSE PRACTITIONER

## 2025-08-26 PROCEDURE — 99001 SPECIMEN HANDLING PT-LAB: CPT

## 2025-08-26 RX ORDER — FLUCONAZOLE 150 MG/1
150 TABLET ORAL
Qty: 2 TABLET | Refills: 0 | Status: SHIPPED | OUTPATIENT
Start: 2025-08-26 | End: 2025-09-01

## 2025-08-26 RX ORDER — LISINOPRIL AND HYDROCHLOROTHIAZIDE 10; 12.5 MG/1; MG/1
1 TABLET ORAL DAILY
Qty: 90 TABLET | Refills: 0 | Status: CANCELLED | OUTPATIENT
Start: 2025-08-26

## 2025-08-26 RX ORDER — LAMOTRIGINE 25 MG/1
25 TABLET ORAL NIGHTLY
Qty: 30 TABLET | Refills: 2 | Status: SHIPPED | OUTPATIENT
Start: 2025-08-26

## 2025-08-26 SDOH — ECONOMIC STABILITY: FOOD INSECURITY: WITHIN THE PAST 12 MONTHS, YOU WORRIED THAT YOUR FOOD WOULD RUN OUT BEFORE YOU GOT MONEY TO BUY MORE.: NEVER TRUE

## 2025-08-26 SDOH — ECONOMIC STABILITY: FOOD INSECURITY: WITHIN THE PAST 12 MONTHS, THE FOOD YOU BOUGHT JUST DIDN'T LAST AND YOU DIDN'T HAVE MONEY TO GET MORE.: NEVER TRUE

## 2025-08-26 ASSESSMENT — PATIENT HEALTH QUESTIONNAIRE - PHQ9
1. LITTLE INTEREST OR PLEASURE IN DOING THINGS: MORE THAN HALF THE DAYS
SUM OF ALL RESPONSES TO PHQ QUESTIONS 1-9: 8
6. FEELING BAD ABOUT YOURSELF - OR THAT YOU ARE A FAILURE OR HAVE LET YOURSELF OR YOUR FAMILY DOWN: NOT AT ALL
SUM OF ALL RESPONSES TO PHQ QUESTIONS 1-9: 8
10. IF YOU CHECKED OFF ANY PROBLEMS, HOW DIFFICULT HAVE THESE PROBLEMS MADE IT FOR YOU TO DO YOUR WORK, TAKE CARE OF THINGS AT HOME, OR GET ALONG WITH OTHER PEOPLE: SOMEWHAT DIFFICULT
4. FEELING TIRED OR HAVING LITTLE ENERGY: MORE THAN HALF THE DAYS
5. POOR APPETITE OR OVEREATING: NOT AT ALL
SUM OF ALL RESPONSES TO PHQ QUESTIONS 1-9: 8
8. MOVING OR SPEAKING SO SLOWLY THAT OTHER PEOPLE COULD HAVE NOTICED. OR THE OPPOSITE, BEING SO FIGETY OR RESTLESS THAT YOU HAVE BEEN MOVING AROUND A LOT MORE THAN USUAL: NOT AT ALL
SUM OF ALL RESPONSES TO PHQ QUESTIONS 1-9: 8
9. THOUGHTS THAT YOU WOULD BE BETTER OFF DEAD, OR OF HURTING YOURSELF: NOT AT ALL
2. FEELING DOWN, DEPRESSED OR HOPELESS: MORE THAN HALF THE DAYS
7. TROUBLE CONCENTRATING ON THINGS, SUCH AS READING THE NEWSPAPER OR WATCHING TELEVISION: NOT AT ALL
3. TROUBLE FALLING OR STAYING ASLEEP: MORE THAN HALF THE DAYS

## 2025-08-27 LAB
A/G RATIO: 1.6 RATIO (ref 1.1–2.6)
ALBUMIN: 4.4 G/DL (ref 3.5–5)
ALP BLD-CCNC: 68 U/L (ref 25–115)
ALT SERPL-CCNC: 15 U/L (ref 5–40)
ANION GAP SERPL CALCULATED.3IONS-SCNC: 12 MMOL/L (ref 3–15)
AST SERPL-CCNC: 16 U/L (ref 10–37)
BILIRUB SERPL-MCNC: 0.4 MG/DL (ref 0.2–1.2)
BUN BLDV-MCNC: 12 MG/DL (ref 6–22)
CALCIUM SERPL-MCNC: 9.3 MG/DL (ref 8.4–10.5)
CHLORIDE BLD-SCNC: 100 MMOL/L (ref 98–110)
CHOLESTEROL, TOTAL: 125 MG/DL (ref 110–200)
CHOLESTEROL/HDL RATIO: 3.1 (ref 0–5)
CO2: 27 MMOL/L (ref 20–32)
CREAT SERPL-MCNC: 0.9 MG/DL (ref 0.5–1.2)
ESTIMATED AVERAGE GLUCOSE: 104 MG/DL (ref 91–123)
GFR, ESTIMATED: 77.4 ML/MIN/1.73 SQ.M.
GLOBULIN: 2.7 G/DL (ref 2–4)
GLUCOSE: 90 MG/DL (ref 70–99)
HBA1C MFR BLD: 5.3 % (ref 4.8–5.6)
HDLC SERPL-MCNC: 40 MG/DL
LDL CHOLESTEROL: 60 MG/DL (ref 50–99)
LDL/HDL RATIO: 1.5
NON-HDL CHOLESTEROL: 85 MG/DL
POTASSIUM SERPL-SCNC: 3.9 MMOL/L (ref 3.5–5.5)
SODIUM BLD-SCNC: 139 MMOL/L (ref 133–145)
TOTAL PROTEIN: 7.1 G/DL (ref 6.4–8.3)
TRIGL SERPL-MCNC: 122 MG/DL (ref 40–149)
TSH SERPL DL<=0.05 MIU/L-ACNC: 1.24 MCU/ML (ref 0.27–4.2)
VLDLC SERPL CALC-MCNC: 24 MG/DL (ref 8–30)

## 2025-08-28 LAB
COLLECTION METHOD: NORMAL
FINAL DIAGNOSIS: NORMAL
GYNECOLOGIC CYTOLOGY REPORT: NORMAL
HPV, GENOTYPE 16: NEGATIVE
HPV, GENOTYPE 18: NEGATIVE
HPV, HIGH RISK: NEGATIVE
LMP: NORMAL
Lab: NORMAL
Lab: NORMAL
PAP NOTE: NORMAL
RELATED HPV RESULT(S): NORMAL
SPECIMEN ADEQUACY:: NORMAL
VAGINITIS PLUS, APTIMA SWAB: ABNORMAL

## 2025-08-29 ASSESSMENT — ENCOUNTER SYMPTOMS
WHEEZING: 0
NAUSEA: 0
COUGH: 0
CONSTIPATION: 0
ABDOMINAL DISTENTION: 1
CHEST TIGHTNESS: 0
STRIDOR: 0
SHORTNESS OF BREATH: 0
ABDOMINAL PAIN: 0
BLOOD IN STOOL: 0

## 2025-09-01 DIAGNOSIS — I10 ESSENTIAL (PRIMARY) HYPERTENSION: ICD-10-CM

## 2025-09-02 ENCOUNTER — HOSPITAL ENCOUNTER (OUTPATIENT)
Facility: HOSPITAL | Age: 40
Discharge: HOME | End: 2025-09-05
Payer: COMMERCIAL

## 2025-09-02 DIAGNOSIS — R14.0 BLOATING SYMPTOM: ICD-10-CM

## 2025-09-02 DIAGNOSIS — Z80.41 FAMILY HISTORY OF OVARIAN CANCER: ICD-10-CM

## 2025-09-02 PROCEDURE — 93975 VASCULAR STUDY: CPT

## 2025-09-02 RX ORDER — LISINOPRIL AND HYDROCHLOROTHIAZIDE 10; 12.5 MG/1; MG/1
1 TABLET ORAL DAILY
Qty: 90 TABLET | Refills: 0 | Status: SHIPPED | OUTPATIENT
Start: 2025-09-02